# Patient Record
Sex: FEMALE | Race: WHITE | Employment: OTHER | URBAN - NONMETROPOLITAN AREA
[De-identification: names, ages, dates, MRNs, and addresses within clinical notes are randomized per-mention and may not be internally consistent; named-entity substitution may affect disease eponyms.]

---

## 2017-03-23 ENCOUNTER — COMMUNICATION - GICH (OUTPATIENT)
Dept: FAMILY MEDICINE | Facility: OTHER | Age: 82
End: 2017-03-23

## 2017-03-23 DIAGNOSIS — J30.89 OTHER ALLERGIC RHINITIS: ICD-10-CM

## 2017-03-27 ENCOUNTER — AMBULATORY - GICH (OUTPATIENT)
Dept: SCHEDULING | Facility: OTHER | Age: 82
End: 2017-03-27

## 2017-10-25 ENCOUNTER — AMBULATORY - GICH (OUTPATIENT)
Dept: FAMILY MEDICINE | Facility: OTHER | Age: 82
End: 2017-10-25

## 2017-10-25 DIAGNOSIS — Z23 ENCOUNTER FOR IMMUNIZATION: ICD-10-CM

## 2017-12-26 ENCOUNTER — COMMUNICATION - GICH (OUTPATIENT)
Dept: FAMILY MEDICINE | Facility: OTHER | Age: 82
End: 2017-12-26

## 2017-12-26 DIAGNOSIS — I25.10 ATHEROSCLEROTIC HEART DISEASE OF NATIVE CORONARY ARTERY WITHOUT ANGINA PECTORIS: ICD-10-CM

## 2017-12-26 DIAGNOSIS — J30.89 OTHER ALLERGIC RHINITIS: ICD-10-CM

## 2017-12-26 DIAGNOSIS — L90.0 LICHEN SCLEROSUS ET ATROPHICUS: ICD-10-CM

## 2018-01-01 DIAGNOSIS — L90.0 LICHEN SCLEROSUS: Primary | ICD-10-CM

## 2018-01-01 RX ORDER — CLOBETASOL PROPIONATE 0.5 MG/G
OINTMENT TOPICAL
Qty: 30 G | Refills: 1 | Status: ON HOLD | OUTPATIENT
Start: 2018-01-01 | End: 2019-01-01 | Stop reason: DRUGHIGH

## 2018-01-04 NOTE — TELEPHONE ENCOUNTER
Patient Information     Patient Name MRN Sex Karly Marie [id number] Female 1935      Telephone Encounter by Alana Willett RN at 3/23/2017 11:12 AM     Author:  Alana Willett RN Service:  (none) Author Type:  NURS- Registered Nurse     Filed:  3/23/2017 11:15 AM Encounter Date:  3/23/2017 Status:  Signed     :  Alana Willett RN (NURS- Registered Nurse)            Antihistamines:    Office visit in the past 12 months.    Last visit with GALDINO MONSIVAIS was on: 2016 in [facility]  Next visit with GALDINO MONSIVAIS is on: No future appointment listed with this provider  Next visit with Family Practice is on: No future appointment listed in this department    Max refills 12 months from last office visit.  Prescription refilled per RN Medication Refill Policy.................... Alana Willett RN ....................  3/23/2017   11:14 AM

## 2018-02-12 NOTE — TELEPHONE ENCOUNTER
Patient Information     Patient Name MRN Sex Karly Marie [id number] Female 1935      Telephone Encounter by Alana Goldberg at 2017  2:06 PM     Author:  Alana Goldberg Service:  (none) Author Type:  (none)     Filed:  2017  2:07 PM Encounter Date:  2017 Status:  Signed     :  Alana Goldberg            Patient notified prescriptions were filled and she should schedule establish care appointment.  Alana Goldberg LPN..................2017  2:07 PM

## 2018-02-12 NOTE — TELEPHONE ENCOUNTER
Patient Information     Patient Name MRN Sex Karly Marie [id number] Female 1935      Telephone Encounter by Alana Fraga RN at 2017 10:20 AM     Author:  Alana Fraga RN Service:  (none) Author Type:  NURS- Registered Nurse     Filed:  2017 11:18 AM Encounter Date:  2017 Status:  Signed     :  Alana Fraga RN (NURS- Registered Nurse)            PLEASE REVIEW, SIGN AND SEND AS APPROPRIATE: THANK YOU.    Patient will be due for annual visit on 2017. In the absence of Galdino Monsivais MD patient was called to recommend that they establish care with one of our other providers. Patient intends to establish care with Sydney Viera DO.    Patient is requesting a limited refill at this time. Please review and sign if appropriate.    This is a Refill request from: [facility]  Name of Medication: clobetasol 0.5% (TEMOVATE 0.005% OINTMENT)  Quantity requested: 15 g  Last fill date: 2016 (#15g, R-2)  Due for refill: Yes  Last visit with GALDINO MONSIVAIS was on: 2016 in [facility]  PCP:  No Pcp  Controlled Substance Agreement: None for this medication  Diagnosis r/t this medication request: Lichen sclerosus    This is a Refill request from: [facility]  Name of Medication: clopidogrel (PLAVIX)  Quantity requested: 90 tabs  Last fill date: 2016 (#90, R-3)  Due for refill: Yes  Last visit with GALDINO MONSIVAIS was on: 2016 in [facility]  PCP:  No Pcp  Controlled Substance Agreement:  None for this medication  Diagnosis r/t this medication request: ASHD    This is a Refill request from: [facility]  Name of Medication: famotidine (PEPCID)  Quantity requested: 180 tabs  Last fill date: 2016 (#180, R-3)  Due for refill: Yes  Last visit with GALDINO MONSIVAIS was on: 2016 in [facility]  PCP:  No Pcp  Controlled Substance Agreement: None for this  medication  Diagnosis r/t this medication request: ashd    This is a Refill request from: [facility]  Name of Medication: loratadine (CLARITIN)  Quantity requested: 90 tabs  Last fill date: 3/23/2017 (#90, R-2)  Due for refill: Yes  Last visit with GALDINO MONSIVAIS was on: 12/30/2016 in [facility]  PCP:  No Pcp  Controlled Substance Agreement:  None for this medication  Diagnosis r/t this medication request: Allergic rhinitis    This is a Refill request from: [facility]  Name of Medication: losartan (COZAAR)  Quantity requested: 90 tabs  Last fill date: 11/23/2016 (#90, R-3)  Due for refill: Yes  Last visit with GALDINO MONSIVAIS was on: 12/30/2016 in [facility]  PCP:  No Pcp  Controlled Substance Agreement: None for this medication  Diagnosis r/t this medication request: ASHD    This is a Refill request from: [facility]  Name of Medication: metoprolol tartrate (LOPRESSOR)  Quantity requested: 180 tabs  Last fill date: 11/23/2016 (#180, R-3)  Due for refill: Yes  Last visit with GALDINO MONSIVAIS was on: 12/30/2016 in [facility]  PCP:  No Pcp  Controlled Substance Agreement: None for this medication  Diagnosis r/t this medication request: ASHD    This is a Refill request from: [facility]  Name of Medication: simvastatin (ZOCOR)  Quantity requested: 90 tabs  Last fill date: 11/23/2016 (#90, R-3)  Due for refill: Yes  Last visit with GALDINO MONSIVAIS was on: 12/30/2016 in [facility]  PCP:  No Pcp  Controlled Substance Agreement: None for this medication  Diagnosis r/t this medication request: ASHD     Unable to complete prescription refill due to permanent absence of prescribing provider.................... Alana Fraga RN ....................  12/28/2017   10:20 AM

## 2018-02-12 NOTE — TELEPHONE ENCOUNTER
Patient Information     Patient Name MRN Sex Karly Marie [id number] Female 1935      Telephone Encounter by Sydney Viera DO at 2017 12:13 PM     Author:  Sydney Viera DO Service:  (none) Author Type:  PHYS- Osteopathic     Filed:  2017 12:13 PM Encounter Date:  2017 Status:  Signed     :  Sydney Viera DO (PHYS- Osteopathic)            Rx refilled.  Patient should set up appointment to est care with me in the next 1-2 months

## 2018-02-23 ENCOUNTER — DOCUMENTATION ONLY (OUTPATIENT)
Dept: FAMILY MEDICINE | Facility: OTHER | Age: 83
End: 2018-02-23

## 2018-02-23 PROBLEM — I78.1 NEVUS, NON-NEOPLASTIC: Status: ACTIVE | Noted: 2018-02-23

## 2018-02-23 PROBLEM — N31.8 HYPERTONICITY OF BLADDER: Status: ACTIVE | Noted: 2018-02-23

## 2018-02-23 PROBLEM — I83.10 VARICOSE VEINS OF LOWER EXTREMITIES WITH INFLAMMATION: Status: ACTIVE | Noted: 2018-02-23

## 2018-02-23 PROBLEM — M94.9 DISORDER OF BONE AND CARTILAGE: Status: ACTIVE | Noted: 2018-02-23

## 2018-02-23 PROBLEM — J30.9 ALLERGIC RHINITIS: Status: ACTIVE | Noted: 2018-02-23

## 2018-02-23 PROBLEM — E66.9 OBESITY: Status: ACTIVE | Noted: 2018-02-23

## 2018-02-23 PROBLEM — E78.5 HYPERLIPIDEMIA: Status: ACTIVE | Noted: 2018-02-23

## 2018-02-23 PROBLEM — L20.9 DERMATITIS, ATOPIC: Status: ACTIVE | Noted: 2018-02-23

## 2018-02-23 PROBLEM — M89.9 DISORDER OF BONE AND CARTILAGE: Status: ACTIVE | Noted: 2018-02-23

## 2018-02-23 PROBLEM — M19.90 OSTEOARTHROSIS: Status: ACTIVE | Noted: 2018-02-23

## 2018-02-23 PROBLEM — I10 ESSENTIAL HYPERTENSION: Status: ACTIVE | Noted: 2018-02-23

## 2018-02-23 RX ORDER — SIMVASTATIN 20 MG
20 TABLET ORAL AT BEDTIME
COMMUNITY
Start: 2017-12-28 | End: 2018-03-30

## 2018-02-23 RX ORDER — LATANOPROST 50 UG/ML
1 SOLUTION/ DROPS OPHTHALMIC EVERY EVENING
COMMUNITY
Start: 2015-11-11

## 2018-02-23 RX ORDER — LOSARTAN POTASSIUM 50 MG/1
1 TABLET ORAL DAILY
COMMUNITY
Start: 2017-12-28 | End: 2018-03-30

## 2018-02-23 RX ORDER — DOCUSATE SODIUM 100 MG/1
100 CAPSULE, LIQUID FILLED ORAL DAILY PRN
COMMUNITY
Start: 2016-12-30

## 2018-02-23 RX ORDER — ACETAMINOPHEN 500 MG
2 TABLET ORAL DAILY PRN
Status: ON HOLD | COMMUNITY
End: 2019-01-01

## 2018-02-23 RX ORDER — LORATADINE 10 MG/1
10 TABLET ORAL DAILY
COMMUNITY
Start: 2017-12-28 | End: 2018-03-30

## 2018-02-23 RX ORDER — METOPROLOL TARTRATE 25 MG/1
25 TABLET, FILM COATED ORAL 2 TIMES DAILY
COMMUNITY
Start: 2017-12-28 | End: 2018-03-30

## 2018-02-23 RX ORDER — CLOBETASOL PROPIONATE 0.5 MG/G
OINTMENT TOPICAL
COMMUNITY
Start: 2017-12-28 | End: 2018-01-01

## 2018-02-23 RX ORDER — DORZOLAMIDE HYDROCHLORIDE AND TIMOLOL MALEATE 20; 5 MG/ML; MG/ML
1 SOLUTION/ DROPS OPHTHALMIC 2 TIMES DAILY
COMMUNITY
Start: 2015-11-11

## 2018-02-23 RX ORDER — FAMOTIDINE 20 MG/1
20 TABLET, FILM COATED ORAL 2 TIMES DAILY
COMMUNITY
Start: 2017-12-28 | End: 2018-03-30

## 2018-02-23 RX ORDER — NITROGLYCERIN 0.4 MG/1
0.4 TABLET SUBLINGUAL EVERY 5 MIN PRN
COMMUNITY
Start: 2016-11-23 | End: 2018-05-10

## 2018-02-23 RX ORDER — CLOPIDOGREL BISULFATE 75 MG/1
75 TABLET ORAL EVERY MORNING
COMMUNITY
Start: 2017-12-28 | End: 2018-03-30

## 2018-02-23 RX ORDER — ASPIRIN 81 MG/1
81 TABLET ORAL
Status: ON HOLD | COMMUNITY
Start: 2013-10-30 | End: 2019-01-01

## 2018-03-30 DIAGNOSIS — J31.0 CHRONIC RHINITIS, UNSPECIFIED TYPE: ICD-10-CM

## 2018-03-30 DIAGNOSIS — I25.10 ASHD (ARTERIOSCLEROTIC HEART DISEASE): ICD-10-CM

## 2018-03-30 DIAGNOSIS — I10 ESSENTIAL HYPERTENSION: Primary | ICD-10-CM

## 2018-03-30 DIAGNOSIS — E78.5 HYPERLIPIDEMIA, UNSPECIFIED HYPERLIPIDEMIA TYPE: ICD-10-CM

## 2018-03-30 NOTE — LETTER
April 4, 2018      Karly ROSA Seema  [address] [facility] [address]        Dear Ms. Stephenson,    Your pharmacy has requested a refill of some medications which Dr. Sydney Viera has refilled with a limited supply sent into [facility].      According to our records, you are overdue for annual medication management and labs. Your health is very important to us. Please contact our scheduling line at [phone] to set up an appointment to establish care with Dr. Sydney Viera as Dr. Maldonado is no longer available in the clinic. This will need to be done prior to any additional refills being given.       Thank you for choosing [facility] and [facility]\"" for your health care needs.     Sincerely,        The Refill Nurses  [facility]

## 2018-04-04 RX ORDER — CLOPIDOGREL BISULFATE 75 MG/1
TABLET ORAL
Qty: 90 TABLET | Refills: 0 | Status: SHIPPED | OUTPATIENT
Start: 2018-04-04 | End: 2018-05-10

## 2018-04-04 RX ORDER — FAMOTIDINE 20 MG/1
TABLET, FILM COATED ORAL
Qty: 180 TABLET | Refills: 0 | Status: SHIPPED | OUTPATIENT
Start: 2018-04-04 | End: 2018-05-10

## 2018-04-04 RX ORDER — METOPROLOL TARTRATE 25 MG/1
TABLET, FILM COATED ORAL
Qty: 180 TABLET | Refills: 0 | Status: SHIPPED | OUTPATIENT
Start: 2018-04-04 | End: 2018-05-10

## 2018-04-04 RX ORDER — LOSARTAN POTASSIUM 50 MG/1
TABLET ORAL
Qty: 90 TABLET | Refills: 0 | Status: SHIPPED | OUTPATIENT
Start: 2018-04-04 | End: 2018-05-10

## 2018-04-04 RX ORDER — SIMVASTATIN 20 MG
TABLET ORAL
Qty: 90 TABLET | Refills: 0 | Status: SHIPPED | OUTPATIENT
Start: 2018-04-04 | End: 2018-05-10

## 2018-04-04 RX ORDER — LORATADINE 10 MG/1
TABLET ORAL
Qty: 90 TABLET | Refills: 0 | Status: SHIPPED | OUTPATIENT
Start: 2018-04-04 | End: 2018-05-10

## 2018-04-04 NOTE — TELEPHONE ENCOUNTER
Letter sent to patient informing her that she must establish care prior to additional refills being needed. Alana Retana RN on 4/4/2018 at 9:39 AM

## 2018-04-04 NOTE — TELEPHONE ENCOUNTER
Medications renewed for 90 days.  I would recommend that she set up an establish care visit with me or another provider prior to July

## 2018-04-04 NOTE — TELEPHONE ENCOUNTER
Pharmacy is requesting refill of multiple medications. Patient has not established care with Dr. Viera as she stated she would with limited refills given in December, 2017. (former Dr. Maldonado patient).  Alana Retana RN on 4/4/2018 at 8:48 AM

## 2018-05-10 ENCOUNTER — OFFICE VISIT (OUTPATIENT)
Dept: INTERNAL MEDICINE | Facility: OTHER | Age: 83
End: 2018-05-10
Attending: INTERNAL MEDICINE
Payer: COMMERCIAL

## 2018-05-10 VITALS
TEMPERATURE: 97.4 F | HEIGHT: 63 IN | BODY MASS INDEX: 39.11 KG/M2 | WEIGHT: 220.7 LBS | HEART RATE: 64 BPM | DIASTOLIC BLOOD PRESSURE: 88 MMHG | SYSTOLIC BLOOD PRESSURE: 150 MMHG

## 2018-05-10 DIAGNOSIS — E78.5 HYPERLIPIDEMIA, UNSPECIFIED HYPERLIPIDEMIA TYPE: ICD-10-CM

## 2018-05-10 DIAGNOSIS — J31.0 CHRONIC RHINITIS, UNSPECIFIED TYPE: ICD-10-CM

## 2018-05-10 DIAGNOSIS — E53.8 VITAMIN B12 DEFICIENCY (NON ANEMIC): ICD-10-CM

## 2018-05-10 DIAGNOSIS — R71.8 ELEVATED MCV: ICD-10-CM

## 2018-05-10 DIAGNOSIS — Z79.899 HIGH RISK MEDICATION USE: Primary | ICD-10-CM

## 2018-05-10 DIAGNOSIS — I25.10 ASCVD (ARTERIOSCLEROTIC CARDIOVASCULAR DISEASE): ICD-10-CM

## 2018-05-10 DIAGNOSIS — I10 ESSENTIAL HYPERTENSION: ICD-10-CM

## 2018-05-10 PROBLEM — I78.1 NEVUS, NON-NEOPLASTIC: Status: RESOLVED | Noted: 2018-02-23 | Resolved: 2018-05-10

## 2018-05-10 PROBLEM — I83.10 VARICOSE VEINS OF LOWER EXTREMITIES WITH INFLAMMATION: Status: RESOLVED | Noted: 2018-02-23 | Resolved: 2018-05-10

## 2018-05-10 PROBLEM — L20.9 DERMATITIS, ATOPIC: Status: RESOLVED | Noted: 2018-02-23 | Resolved: 2018-05-10

## 2018-05-10 PROBLEM — J30.9 ALLERGIC RHINITIS: Status: RESOLVED | Noted: 2018-02-23 | Resolved: 2018-05-10

## 2018-05-10 PROBLEM — M89.9 DISORDER OF BONE AND CARTILAGE: Status: RESOLVED | Noted: 2018-02-23 | Resolved: 2018-05-10

## 2018-05-10 PROBLEM — M94.9 DISORDER OF BONE AND CARTILAGE: Status: RESOLVED | Noted: 2018-02-23 | Resolved: 2018-05-10

## 2018-05-10 PROBLEM — M19.90 OSTEOARTHROSIS: Status: RESOLVED | Noted: 2018-02-23 | Resolved: 2018-05-10

## 2018-05-10 LAB
ALBUMIN SERPL-MCNC: 4.1 G/DL (ref 3.5–5.7)
ALP SERPL-CCNC: 77 U/L (ref 34–104)
ALT SERPL W P-5'-P-CCNC: 14 U/L (ref 7–52)
ANION GAP SERPL CALCULATED.3IONS-SCNC: 7 MMOL/L (ref 3–14)
AST SERPL W P-5'-P-CCNC: 19 U/L (ref 13–39)
BASOPHILS # BLD AUTO: 0 10E9/L (ref 0–0.2)
BASOPHILS NFR BLD AUTO: 0.7 %
BILIRUB SERPL-MCNC: 0.7 MG/DL (ref 0.3–1)
BUN SERPL-MCNC: 21 MG/DL (ref 7–25)
CALCIUM SERPL-MCNC: 10.3 MG/DL (ref 8.6–10.3)
CHLORIDE SERPL-SCNC: 102 MMOL/L (ref 98–107)
CO2 SERPL-SCNC: 29 MMOL/L (ref 21–31)
CREAT SERPL-MCNC: 1.04 MG/DL (ref 0.6–1.2)
DIFFERENTIAL METHOD BLD: ABNORMAL
EOSINOPHIL # BLD AUTO: 0.2 10E9/L (ref 0–0.7)
EOSINOPHIL NFR BLD AUTO: 3.3 %
ERYTHROCYTE [DISTWIDTH] IN BLOOD BY AUTOMATED COUNT: 13.3 % (ref 10–15)
GFR SERPL CREATININE-BSD FRML MDRD: 51 ML/MIN/1.7M2
GLUCOSE SERPL-MCNC: 102 MG/DL (ref 70–105)
HCT VFR BLD AUTO: 38.9 % (ref 35–47)
HGB BLD-MCNC: 12.4 G/DL (ref 11.7–15.7)
IMM GRANULOCYTES # BLD: 0 10E9/L (ref 0–0.4)
IMM GRANULOCYTES NFR BLD: 0.2 %
LYMPHOCYTES # BLD AUTO: 2.2 10E9/L (ref 0.8–5.3)
LYMPHOCYTES NFR BLD AUTO: 37.7 %
MCH RBC QN AUTO: 32.4 PG (ref 26.5–33)
MCHC RBC AUTO-ENTMCNC: 31.9 G/DL (ref 31.5–36.5)
MCV RBC AUTO: 102 FL (ref 78–100)
MONOCYTES # BLD AUTO: 0.5 10E9/L (ref 0–1.3)
MONOCYTES NFR BLD AUTO: 9.4 %
NEUTROPHILS # BLD AUTO: 2.8 10E9/L (ref 1.6–8.3)
NEUTROPHILS NFR BLD AUTO: 48.7 %
PLATELET # BLD AUTO: 141 10E9/L (ref 150–450)
POTASSIUM SERPL-SCNC: 4.4 MMOL/L (ref 3.5–5.1)
PROT SERPL-MCNC: 7.1 G/DL (ref 6.4–8.9)
RBC # BLD AUTO: 3.83 10E12/L (ref 3.8–5.2)
SODIUM SERPL-SCNC: 138 MMOL/L (ref 134–144)
VIT B12 SERPL-MCNC: >1500 PG/ML (ref 180–914)
WBC # BLD AUTO: 5.8 10E9/L (ref 4–11)

## 2018-05-10 PROCEDURE — 80053 COMPREHEN METABOLIC PANEL: CPT | Performed by: INTERNAL MEDICINE

## 2018-05-10 PROCEDURE — 36415 COLL VENOUS BLD VENIPUNCTURE: CPT | Performed by: INTERNAL MEDICINE

## 2018-05-10 PROCEDURE — 82607 VITAMIN B-12: CPT | Performed by: INTERNAL MEDICINE

## 2018-05-10 PROCEDURE — G0463 HOSPITAL OUTPT CLINIC VISIT: HCPCS

## 2018-05-10 PROCEDURE — 85025 COMPLETE CBC W/AUTO DIFF WBC: CPT | Performed by: INTERNAL MEDICINE

## 2018-05-10 PROCEDURE — 99215 OFFICE O/P EST HI 40 MIN: CPT | Performed by: INTERNAL MEDICINE

## 2018-05-10 RX ORDER — FAMOTIDINE 20 MG/1
20 TABLET, FILM COATED ORAL 2 TIMES DAILY
Qty: 180 TABLET | Refills: 4 | Status: SHIPPED | OUTPATIENT
Start: 2018-05-10

## 2018-05-10 RX ORDER — METOPROLOL TARTRATE 25 MG/1
25 TABLET, FILM COATED ORAL 2 TIMES DAILY
Qty: 180 TABLET | Refills: 4 | Status: SHIPPED | OUTPATIENT
Start: 2018-05-10

## 2018-05-10 RX ORDER — NITROGLYCERIN 0.4 MG/1
0.4 TABLET SUBLINGUAL EVERY 5 MIN PRN
Qty: 25 TABLET | Refills: 0 | Status: SHIPPED | OUTPATIENT
Start: 2018-05-10

## 2018-05-10 RX ORDER — SIMVASTATIN 20 MG
20 TABLET ORAL AT BEDTIME
Qty: 90 TABLET | Refills: 4 | Status: SHIPPED | OUTPATIENT
Start: 2018-05-10

## 2018-05-10 RX ORDER — CLOPIDOGREL BISULFATE 75 MG/1
75 TABLET ORAL DAILY
Qty: 90 TABLET | Refills: 4 | Status: ON HOLD | OUTPATIENT
Start: 2018-05-10 | End: 2019-01-01

## 2018-05-10 RX ORDER — LOSARTAN POTASSIUM 50 MG/1
50 TABLET ORAL DAILY
Qty: 90 TABLET | Refills: 4 | Status: ON HOLD | OUTPATIENT
Start: 2018-05-10 | End: 2019-01-01

## 2018-05-10 RX ORDER — LORATADINE 10 MG/1
10 TABLET ORAL DAILY
Qty: 90 TABLET | Refills: 4 | Status: SHIPPED | OUTPATIENT
Start: 2018-05-10

## 2018-05-10 ASSESSMENT — PAIN SCALES - GENERAL: PAINLEVEL: NO PAIN (0)

## 2018-05-10 NOTE — NURSING NOTE
Patient is here today for her yearly physical and med management.Tegan Rendon LPN......................5/10/2018 2:49 PM

## 2018-05-10 NOTE — PROGRESS NOTES
Chief Complaint   Patient presents with     Recheck Medication     med management          HPI: Ms. Stephenson is a 83 year old female who presents today for annual review of her medications.  She overall is feeling good.      She has a history of hypertension and is on losartan and metoprolol for this.  Her blood pressure at home has been 120/60.  It is slightly elevated here today though she states it is always elevated in the clinic.  She also has a history of cardiovascular disease.  She is no longer following with cardiology.  In 2010 she did undergo stenting.  She has been on Plavix and aspirin since then.  She has not had any issues with the medication.  She does believe she was instructed to be on this lifelong.  She does take simvastatin for her hyperlipidemia.      She does have a chronic rhinitis and does take Claritin for this.  She denies any side effects from the medication.  She has been taking vitamin B12 limitation.  She did previously see oncology and was on injections at one-point however did switch to oral supplementation over a year ago.  She has not had her B12 rechecked.  In reviewing her last CBC she did have an elevation of her MCV.    She is post menopausal.  She is up-to-date on her vaccines at this time.  She is no longer interested in cancer screening.    History is discussed and updated on 5/10/2018 with patient.  It is current to the best of my knowledge as below.    Past Medical History:   Diagnosis Date     Allergic rhinitis      ASCVD (arteriosclerotic cardiovascular disease) 05/05/2010    STEMI  received TNK, Cor angio with Markedly ectatic proximal to mid dominant LCX with distal likely embolic OM3 culprit     Carcinoma in situ of vulva 2000     Initial CIS, subsequent ALEXANDER III, local excisions     Colon polyp 10/11/2005     Dermatitis, atopic 2/23/2018     Essential hypertension 2/23/2018     Glaucoma 2005     Hyperlipidemia      Hypertonic bladder      Low back pain      Obesity       Osteoarthritis      Phlebitis and thrombophlebitis     2007, 2012,legs     Thrombocytopenia (H)      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     Vitamin B 12 deficiency 3/5/2014        Past Surgical History:   Procedure Laterality Date     ANGIOGRAM  05/2010    coronary angio at [facility]     ARTHROPLASTY HIP  1999    done after fracture     ARTHROPLASTY KNEE Bilateral 2000     ARTHROSCOPY SHOULDER ROTATOR CUFF REPAIR Left 2002     COLONOSCOPY  10/2005    tubular adenoma     EYE SURGERY  04/2008    laser surgery glaucoma; again in 2012     TONSILLECTOMY, ADENOIDECTOMY, COMBINED  1999     VULVECTOMY SIMPLE  2000    CIS     VULVECTOMY SIMPLE  06/2005    ALEXANDER III         Current Outpatient Prescriptions   Medication Sig Dispense Refill     acetaminophen (TYLENOL) 500 MG tablet Take 2 tablets by mouth daily . Max acetaminophen dose: 4,000 mg per 24 hours.       aspirin EC 81 MG EC tablet Take 81 mg by mouth daily with food       calcium citrate-vitamin D (CALCIUM CITRATE + D) 315-250 MG-UNIT TABS per tablet Take 2 tablets by mouth 2 times daily (with meals)       Cholecalciferol (VITAMIN D3) 1000 UNITS CAPS Take 1,000 Units by mouth daily       clobetasol (TEMOVATE) 0.05 % ointment        clopidogrel (PLAVIX) 75 MG tablet Take 1 tablet (75 mg) by mouth daily 90 tablet 4     cyanocobalamin (RA VITAMIN B-12 TR) 1000 MCG TBCR Take 1,000 mcg by mouth daily       docusate sodium (COLACE) 100 MG capsule Take 100 mg by mouth 2 times daily as needed for constipation       dorzolamide-timolol (COSOPT) 2-0.5 % ophthalmic solution Place 1 drop into both eyes 2 times daily       famotidine (PEPCID) 20 MG tablet Take 1 tablet (20 mg) by mouth 2 times daily 180 tablet 4     latanoprost (XALATAN) 0.005 % ophthalmic solution Place 1 drop into both eyes every evening       loratadine (CLARITIN) 10 MG tablet Take 1 tablet (10 mg) by mouth daily 90 tablet 4     losartan (COZAAR) 50 MG tablet Take 1  tablet (50 mg) by mouth daily 90 tablet 4     metoprolol tartrate (LOPRESSOR) 25 MG tablet Take 1 tablet (25 mg) by mouth 2 times daily 180 tablet 4     nitroGLYcerin (NITROSTAT) 0.4 MG sublingual tablet Place 1 tablet (0.4 mg) under the tongue every 5 minutes as needed for chest pain 25 tablet 0     simvastatin (ZOCOR) 20 MG tablet Take 1 tablet (20 mg) by mouth At Bedtime 90 tablet 4       Allergies   Allergen Reactions     Codeine Nausea     Doxycycline Other (See Comments)     jittery        Family History   Problem Relation Age of Onset     Hypertension Father      Hypertension,h/o CVA.  in his 70s     CANCER Mother      Cancer, age 92, lung cancer     Breast Cancer Sister      Ulcerative Colitis Sister      DIABETES Sister      No Known Problems Brother        Social History     Social History     Marital status:      Spouse name: David     Number of children: N/A     Years of education: N/A     Social History Main Topics     Smoking status: Former Smoker     Packs/day: 0.50     Years: 30.00     Types: Cigarettes     Quit date: 1982     Smokeless tobacco: Never Used     Alcohol use No     Drug use: No      Comment: Drug use: No     Sexual activity: Not Currently     Other Topics Concern     None     Social History Narrative    Patient is , three adopted children.   with early dementia.              ROS  GEN: -fevers/-chills/-night sweats/+wt change -6 pound weight gain in the last year and a half overall stable from past  NEURO: -headaches/-vision changes  EARS: -hearing changes/-tinnitus  NOSE: -drainage/-congestion  MOUTH/THROAT: - sore throat/-dysphagia/-sores  LUNGS: -sob/-cough  CARDIOVASCULAR: -cp/-palpitations  GI: -pain/-diarrhea/-constipation/-bloody stools  :-dysuria/-hematuria/-sores/-vaginal discharge or bleeding  ENDOCRINE: -skin or hair changes  HEMATOLOGIC/LYMPHATIC: -swollen nodes  SKIN: -rashes/-lesions/-breast or nipple changes  MSK/RHEUM: +joint  "pain/-swelling  NEURO: -weakness/-parasthesias  PSYCH: -anhedonia/-depression/-anxiety  SLEEP: -daytime somnolence         EXAM:   /88 (BP Location: Right arm, Patient Position: Sitting, Cuff Size: Adult Large)  Pulse 64  Temp 97.4  F (36.3  C) (Tympanic)  Ht 5' 2.5\" (1.588 m)  Wt 220 lb 11.2 oz (100.1 kg)  Breastfeeding? No  BMI 39.72 kg/m2  Estimated body mass index is 39.72 kg/(m^2) as calculated from the following:    Height as of this encounter: 5' 2.5\" (1.588 m).    Weight as of this encounter: 220 lb 11.2 oz (100.1 kg).      GEN: Vitals reviewed. Healthy appearing. Patient is in no acute distress. Cooperative with exam.  HEENT: Normocephalic atraumatic.  Normal external eye, conjunctiva, lids, cornea with no scleral icterus or conjunctival erythema. Pupils equally round. Oropharynx with no erythema or exudates. Dentition adequate.    NECK: Supple; no thyromegaly or masses noted.  No cervical or supraclavicular lymphadenopathy.  CV: Heart regular in rate and rhythm with no murmur.    LUNGS: Lungs clear to auscultation bilaterally.  Chest rise equal bilaterally.  No accessory muscle use.  ABD:  Obese, Soft, nontender, and nondistended.  No rebound. Bowel sounds positive.  SKIN: Warm and dry to touch.  No rash on face, arms and legs.  EXT: No clubbing or cyanosis.   1+ bilateral lower extremity peripheral edema.  NEURO: Alert and oriented to person, place, and time.  Cranial nerves II-XII grossly intact with no focal or lateralizing deficits.  Muscle tone normal.  Gait normal. No tremor. Sensation intact to light touch.  MSK: ROM of upper and lower ext symmetric and full.  PSYCH:Affect appropriate. Speech fluent. Answers questions appropriately and thought process normal.    LABS: 5/10/2018 - Personally ordered/reviewed  Results for orders placed or performed in visit on 05/10/18   Comprehensive metabolic panel   Result Value Ref Range    Sodium 138 134 - 144 mmol/L    Potassium 4.4 3.5 - 5.1 mmol/L "    Chloride 102 98 - 107 mmol/L    Carbon Dioxide 29 21 - 31 mmol/L    Anion Gap 7 3 - 14 mmol/L    Glucose 102 70 - 105 mg/dL    Urea Nitrogen 21 7 - 25 mg/dL    Creatinine 1.04 0.60 - 1.20 mg/dL    GFR Estimate 51 (L) >60 mL/min/1.7m2    GFR Estimate If Black 61 >60 mL/min/1.7m2    Calcium 10.3 8.6 - 10.3 mg/dL    Bilirubin Total 0.7 0.3 - 1.0 mg/dL    Albumin 4.1 3.5 - 5.7 g/dL    Protein Total 7.1 6.4 - 8.9 g/dL    Alkaline Phosphatase 77 34 - 104 U/L    ALT 14 7 - 52 U/L    AST 19 13 - 39 U/L   CBC and Differential   Result Value Ref Range    WBC 5.8 4.0 - 11.0 10e9/L    RBC Count 3.83 3.8 - 5.2 10e12/L    Hemoglobin 12.4 11.7 - 15.7 g/dL    Hematocrit 38.9 35.0 - 47.0 %     (H) 78 - 100 fl    MCH 32.4 26.5 - 33.0 pg    MCHC 31.9 31.5 - 36.5 g/dL    RDW 13.3 10.0 - 15.0 %    Platelet Count 141 (L) 150 - 450 10e9/L    Diff Method Automated Method     % Neutrophils 48.7 %    % Lymphocytes 37.7 %    % Monocytes 9.4 %    % Eosinophils 3.3 %    % Basophils 0.7 %    % Immature Granulocytes 0.2 %    Absolute Neutrophil 2.8 1.6 - 8.3 10e9/L    Absolute Lymphocytes 2.2 0.8 - 5.3 10e9/L    Absolute Monocytes 0.5 0.0 - 1.3 10e9/L    Absolute Eosinophils 0.2 0.0 - 0.7 10e9/L    Absolute Basophils 0.0 0.0 - 0.2 10e9/L    Abs Immature Granulocytes 0.0 0 - 0.4 10e9/L   Vitamin B12   Result Value Ref Range    Vitamin B12 >1500 (H) 180 - 914 pg/mL             ASSESSMENT AND PLAN:    1. Hyperlipidemia, unspecified hyperlipidemia type  -Stable at this time.    - On statin with no clinical evidence or complaint of myalgias or other ADRs  - Discussed importance of exercise and diet.  Weight management plan: Discussed healthy diet and exercise guidelines and patient will follow up in 12 months in clinic to re-evaluate.  - simvastatin (ZOCOR) 20 MG tablet; Take 1 tablet (20 mg) by mouth At Bedtime  Dispense: 90 tablet; Refill: 4    2. Essential hypertension  - Blood pressure today of150/88  Is close to the goal of <150/90.  -  Continue current regimen currently.  Instructed to check BP at home.  - Cautioned patient to monitor with antibiotics, herbals and any OTC medications  - electrolytes and renal function done and stable  - metoprolol tartrate (LOPRESSOR) 25 MG tablet; Take 1 tablet (25 mg) by mouth 2 times daily  Dispense: 180 tablet; Refill: 4  - losartan (COZAAR) 50 MG tablet; Take 1 tablet (50 mg) by mouth daily  Dispense: 90 tablet; Refill: 4  - Comprehensive metabolic panel    3. ASCVD (arteriosclerotic cardiovascular disease)  - on asa,statin, ARB, beta blocker, SLNTG  - no symptoms at this time; continue meds as prescribed although I will obtain outside records to review regarding her episode in 2010 with her STEMI and whether there is a reason to continue on the Plavix plus aspirin.  - metoprolol tartrate (LOPRESSOR) 25 MG tablet; Take 1 tablet (25 mg) by mouth 2 times daily  Dispense: 180 tablet; Refill: 4  - losartan (COZAAR) 50 MG tablet; Take 1 tablet (50 mg) by mouth daily  Dispense: 90 tablet; Refill: 4  - famotidine (PEPCID) 20 MG tablet; Take 1 tablet (20 mg) by mouth 2 times daily  Dispense: 180 tablet; Refill: 4  - clopidogrel (PLAVIX) 75 MG tablet; Take 1 tablet (75 mg) by mouth daily  Dispense: 90 tablet; Refill: 4  - nitroGLYcerin (NITROSTAT) 0.4 MG sublingual tablet; Place 1 tablet (0.4 mg) under the tongue every 5 minutes as needed for chest pain  Dispense: 25 tablet; Refill: 0    4. Chronic rhinitis, unspecified type  -Medication renewed.  - loratadine (CLARITIN) 10 MG tablet; Take 1 tablet (10 mg) by mouth daily  Dispense: 90 tablet; Refill: 4    5. High risk medication use  - Comprehensive metabolic panel  - CBC and Differential    6. Vitamin B12 deficiency (non anemic)  -B12 level is slightly high at this time.  She can decrease her supplement every other day or half tablet daily.  Plan to recheck in 1 year.  - Vitamin B12    7. Elevated MCV  -Rechecked and overall is stable.  We will continue to monitor  yearly.  - CBC and Differential    Follow-up in 1 year for annual review or as needed sooner.        WIL CAMPBELL DO   5/10/2018 4:53 PM    This document was prepared using voice generated softwear. While every attempt was made for accuracy, spelling and grammatical errors may exist.

## 2018-05-10 NOTE — MR AVS SNAPSHOT
"              After Visit Summary   5/10/2018    Karly Stephenson    MRN: [id number]           Patient Information     Date Of Birth          1935        Visit Information        Provider Department      5/10/2018 2:40 PM Sydney Viera DO [facility]        Today's Diagnoses     High risk medication use    -  1    Hyperlipidemia, unspecified hyperlipidemia type        Essential hypertension        ASCVD (arteriosclerotic cardiovascular disease)        Chronic rhinitis, unspecified type        Vitamin B12 deficiency (non anemic)        Elevated MCV           Follow-ups after your visit        Follow-up notes from your care team     Return in about 1 year (around 5/10/2019) for Annual Review.      Who to contact     If you have questions or need follow up information about today's clinic visit or your schedule please contact [facility] directly at [phone].  Normal or non-critical lab and imaging results will be communicated to you by Nukonahart, letter or phone within 4 business days after the clinic has received the results. If you do not hear from us within 7 days, please contact the clinic through Nukonahart or phone. If you have a critical or abnormal lab result, we will notify you by phone as soon as possible.  Submit refill requests through beRecruited or call your pharmacy and they will forward the refill request to us. Please allow 3 business days for your refill to be completed.          Additional Information About Your Visit        Nukonahart Information     beRecruited lets you send messages to your doctor, view your test results, renew your prescriptions, schedule appointments and more. To sign up, go to [email or web address] . Click on \"Log in\" on the left side of the screen, which will take you to the Welcome page. Then click on \"Sign up Now\" on the right side of the page.     You will be asked to enter the access code listed below, as well as some personal " "information. Please follow the directions to create your username and password.     Your access code is: [email or web address]  Expires: 2018  3:21 PM     Your access code will  in 90 days. If you need help or a new code, please call your [place] clinic or [phone].        Care EveryWhere ID     This is your Care EveryWhere ID. This could be used by other organizations to access your [place] medical records  [id number]        Your Vitals Were     Pulse Temperature Height Breastfeeding? BMI (Body Mass Index)       64 97.4  F (36.3  C) (Tympanic) 5' 2.5\" (1.588 m) No 39.72 kg/m2        Blood Pressure from Last 3 Encounters:   05/10/18 150/88   16 110/78   16 148/80    Weight from Last 3 Encounters:   05/10/18 220 lb 11.2 oz (100.1 kg)   16 214 lb (97.1 kg)   16 205 lb (93 kg)              We Performed the Following     CBC and Differential     Comprehensive metabolic panel     Vitamin B12          Today's Medication Changes          These changes are accurate as of 5/10/18  3:21 PM.  If you have any questions, ask your nurse or doctor.               These medicines have changed or have updated prescriptions.        Dose/Directions    clopidogrel 75 MG tablet   Commonly known as:  PLAVIX   This may have changed:  See the new instructions.   Used for:  ASCVD (arteriosclerotic cardiovascular disease)   Changed by:  Sydney Viera DO        Dose:  75 mg   Take 1 tablet (75 mg) by mouth daily   Quantity:  90 tablet   Refills:  4       famotidine 20 MG tablet   Commonly known as:  PEPCID   This may have changed:  See the new instructions.   Used for:  ASCVD (arteriosclerotic cardiovascular disease)   Changed by:  Sydney Viera DO        Dose:  20 mg   Take 1 tablet (20 mg) by mouth 2 times daily   Quantity:  180 tablet   Refills:  4       loratadine 10 MG tablet   Commonly known as:  CLARITIN   This may have changed:  See the new instructions.   Used for:  Chronic rhinitis, unspecified type "   Changed by:  Sydney Viera DO        Dose:  10 mg   Take 1 tablet (10 mg) by mouth daily   Quantity:  90 tablet   Refills:  4       losartan 50 MG tablet   Commonly known as:  COZAAR   This may have changed:  See the new instructions.   Used for:  Essential hypertension, ASCVD (arteriosclerotic cardiovascular disease)   Changed by:  Sydney Viera DO        Dose:  50 mg   Take 1 tablet (50 mg) by mouth daily   Quantity:  90 tablet   Refills:  4       metoprolol tartrate 25 MG tablet   Commonly known as:  LOPRESSOR   This may have changed:  See the new instructions.   Used for:  Essential hypertension, ASCVD (arteriosclerotic cardiovascular disease)   Changed by:  Sydney Viera DO        Dose:  25 mg   Take 1 tablet (25 mg) by mouth 2 times daily   Quantity:  180 tablet   Refills:  4       simvastatin 20 MG tablet   Commonly known as:  ZOCOR   This may have changed:  See the new instructions.   Used for:  Hyperlipidemia, unspecified hyperlipidemia type   Changed by:  Sydney Viera DO        Dose:  20 mg   Take 1 tablet (20 mg) by mouth At Bedtime   Quantity:  90 tablet   Refills:  4            Where to get your medicines      These medications were sent to [facility] - [facility] [address]  [address], [facility] [address]     Phone:  [phone]     clopidogrel 75 MG tablet    famotidine 20 MG tablet    loratadine 10 MG tablet    losartan 50 MG tablet    metoprolol tartrate 25 MG tablet    nitroGLYcerin 0.4 MG sublingual tablet    simvastatin 20 MG tablet                Primary Care Provider Fax #    Physician No Ref-Primary [phone]       No address on file        Equal Access to Services     ANDREW [facility] HospitalJOSE ANGEL : Hadii anneliese campoo Soeliseo, waaxda luqadaha, qaybta kaalmada adeegyada, annie gorman . So [facility] [phone].    ATENCIÓN: Si habla español, tiene a mas disposición servicios gratuitos de asistencia lingüística. Llame al  [phone].    We comply with applicable federal civil rights laws and [place] laws. We do not discriminate on the basis of race, color, national origin, age, disability, sex, sexual orientation, or gender identity.            Thank you!     Thank you for choosing [facility] [place] [facility]  for your care. Our goal is always to provide you with excellent care. Hearing back from our patients is one way we can continue to improve our services. Please take a few minutes to complete the written survey that you may receive in the mail after your visit with us. Thank you!             Your Updated Medication List - Protect others around you: Learn how to safely use, store and throw away your medicines at www.disposemymeds.org.          This list is accurate as of 5/10/18  3:21 PM.  Always use your most recent med list.                   Brand Name Dispense Instructions for use Diagnosis    acetaminophen 500 MG tablet    TYLENOL     Take 2 tablets by mouth daily . Max acetaminophen dose: 4,000 mg per 24 hours.        aspirin 81 MG EC tablet      Take 81 mg by mouth daily with food        CALCIUM CITRATE + D 315-250 MG-UNIT Tabs per tablet   Generic drug:  calcium citrate-vitamin D      Take 2 tablets by mouth 2 times daily (with meals)        clobetasol 0.05 % ointment    TEMOVATE          clopidogrel 75 MG tablet    PLAVIX    90 tablet    Take 1 tablet (75 mg) by mouth daily    ASCVD (arteriosclerotic cardiovascular disease)       docusate sodium 100 MG capsule    COLACE     Take 100 mg by mouth 2 times daily as needed for constipation        dorzolamide-timolol 2-0.5 % ophthalmic solution    COSOPT     Place 1 drop into both eyes 2 times daily        famotidine 20 MG tablet    PEPCID    180 tablet    Take 1 tablet (20 mg) by mouth 2 times daily    ASCVD (arteriosclerotic cardiovascular disease)       latanoprost 0.005 % ophthalmic solution    XALATAN     Place 1 drop into both eyes every evening         loratadine 10 MG tablet    CLARITIN    90 tablet    Take 1 tablet (10 mg) by mouth daily    Chronic rhinitis, unspecified type       losartan 50 MG tablet    COZAAR    90 tablet    Take 1 tablet (50 mg) by mouth daily    Essential hypertension, ASCVD (arteriosclerotic cardiovascular disease)       metoprolol tartrate 25 MG tablet    LOPRESSOR    180 tablet    Take 1 tablet (25 mg) by mouth 2 times daily    Essential hypertension, ASCVD (arteriosclerotic cardiovascular disease)       nitroGLYcerin 0.4 MG sublingual tablet    NITROSTAT    25 tablet    Place 1 tablet (0.4 mg) under the tongue every 5 minutes as needed for chest pain    ASCVD (arteriosclerotic cardiovascular disease)       RA VITAMIN B-12 TR 1000 MCG Tbcr   Generic drug:  cyanocobalamin      Take 1,000 mcg by mouth daily        simvastatin 20 MG tablet    ZOCOR    90 tablet    Take 1 tablet (20 mg) by mouth At Bedtime    Hyperlipidemia, unspecified hyperlipidemia type       vitamin D3 1000 units Caps      Take 1,000 Units by mouth daily

## 2018-05-11 ASSESSMENT — PATIENT HEALTH QUESTIONNAIRE - PHQ9: SUM OF ALL RESPONSES TO PHQ QUESTIONS 1-9: 0

## 2018-10-11 NOTE — TELEPHONE ENCOUNTER
Routing refill request to provider for review/approval because:  Drug not on the [facility] refill protocol     Last filled 12-28-17, due for refill. LOV 5-10-18. Alana Retana RN on 10/11/2018 at 3:08 PM

## 2019-01-01 ENCOUNTER — TRANSFERRED RECORDS (OUTPATIENT)
Dept: HEALTH INFORMATION MANAGEMENT | Facility: OTHER | Age: 84
End: 2019-01-01

## 2019-01-01 ENCOUNTER — HOSPITAL ENCOUNTER (INPATIENT)
Facility: OTHER | Age: 84
LOS: 6 days | Discharge: ANOTHER HOSPITAL | DRG: 292 | End: 2019-07-21
Attending: EMERGENCY MEDICINE | Admitting: INTERNAL MEDICINE
Payer: COMMERCIAL

## 2019-01-01 ENCOUNTER — APPOINTMENT (OUTPATIENT)
Dept: MRI IMAGING | Facility: OTHER | Age: 84
DRG: 292 | End: 2019-01-01
Attending: INTERNAL MEDICINE
Payer: COMMERCIAL

## 2019-01-01 ENCOUNTER — APPOINTMENT (OUTPATIENT)
Dept: CT IMAGING | Facility: OTHER | Age: 84
DRG: 292 | End: 2019-01-01
Attending: EMERGENCY MEDICINE
Payer: COMMERCIAL

## 2019-01-01 ENCOUNTER — APPOINTMENT (OUTPATIENT)
Dept: CT IMAGING | Facility: OTHER | Age: 84
DRG: 292 | End: 2019-01-01
Attending: INTERNAL MEDICINE
Payer: COMMERCIAL

## 2019-01-01 ENCOUNTER — TELEPHONE (OUTPATIENT)
Dept: INTERNAL MEDICINE | Facility: OTHER | Age: 84
End: 2019-01-01

## 2019-01-01 ENCOUNTER — OFFICE VISIT (OUTPATIENT)
Dept: INTERNAL MEDICINE | Facility: OTHER | Age: 84
DRG: 292 | End: 2019-01-01
Attending: INTERNAL MEDICINE
Payer: COMMERCIAL

## 2019-01-01 ENCOUNTER — APPOINTMENT (OUTPATIENT)
Dept: GENERAL RADIOLOGY | Facility: OTHER | Age: 84
DRG: 292 | End: 2019-01-01
Attending: FAMILY MEDICINE
Payer: COMMERCIAL

## 2019-01-01 ENCOUNTER — APPOINTMENT (OUTPATIENT)
Dept: CARDIOLOGY | Facility: OTHER | Age: 84
DRG: 292 | End: 2019-01-01
Attending: INTERNAL MEDICINE
Payer: COMMERCIAL

## 2019-01-01 ENCOUNTER — HOSPITAL ENCOUNTER (OUTPATIENT)
Facility: OTHER | Age: 84
Setting detail: OBSERVATION
Discharge: OTHER INSTITUTION | DRG: 292 | End: 2019-07-13
Attending: EMERGENCY MEDICINE | Admitting: INTERNAL MEDICINE
Payer: COMMERCIAL

## 2019-01-01 ENCOUNTER — APPOINTMENT (OUTPATIENT)
Dept: ULTRASOUND IMAGING | Facility: OTHER | Age: 84
DRG: 292 | End: 2019-01-01
Attending: FAMILY MEDICINE
Payer: COMMERCIAL

## 2019-01-01 ENCOUNTER — APPOINTMENT (OUTPATIENT)
Dept: GENERAL RADIOLOGY | Facility: OTHER | Age: 84
DRG: 292 | End: 2019-01-01
Attending: INTERNAL MEDICINE
Payer: COMMERCIAL

## 2019-01-01 ENCOUNTER — APPOINTMENT (OUTPATIENT)
Dept: GENERAL RADIOLOGY | Facility: OTHER | Age: 84
DRG: 292 | End: 2019-01-01
Attending: EMERGENCY MEDICINE
Payer: COMMERCIAL

## 2019-01-01 ENCOUNTER — HOSPITAL ENCOUNTER (INPATIENT)
Dept: ULTRASOUND IMAGING | Facility: OTHER | Age: 84
DRG: 292 | End: 2019-07-18
Attending: INTERNAL MEDICINE
Payer: COMMERCIAL

## 2019-01-01 VITALS
WEIGHT: 225.8 LBS | HEIGHT: 63 IN | HEART RATE: 69 BPM | TEMPERATURE: 98 F | DIASTOLIC BLOOD PRESSURE: 74 MMHG | OXYGEN SATURATION: 93 % | RESPIRATION RATE: 16 BRPM | BODY MASS INDEX: 40.01 KG/M2 | SYSTOLIC BLOOD PRESSURE: 151 MMHG

## 2019-01-01 VITALS
SYSTOLIC BLOOD PRESSURE: 154 MMHG | HEART RATE: 67 BPM | OXYGEN SATURATION: 94 % | TEMPERATURE: 98.2 F | DIASTOLIC BLOOD PRESSURE: 90 MMHG | RESPIRATION RATE: 18 BRPM

## 2019-01-01 VITALS
DIASTOLIC BLOOD PRESSURE: 48 MMHG | HEART RATE: 64 BPM | TEMPERATURE: 96.7 F | HEIGHT: 64 IN | WEIGHT: 236 LBS | SYSTOLIC BLOOD PRESSURE: 96 MMHG | RESPIRATION RATE: 16 BRPM | BODY MASS INDEX: 40.29 KG/M2 | OXYGEN SATURATION: 95 %

## 2019-01-01 DIAGNOSIS — R10.10 PAIN OF UPPER ABDOMEN: Primary | ICD-10-CM

## 2019-01-01 DIAGNOSIS — C79.9 METASTATIC CANCER (H): ICD-10-CM

## 2019-01-01 DIAGNOSIS — R74.8 ELEVATED LIVER ENZYMES: ICD-10-CM

## 2019-01-01 DIAGNOSIS — M62.81 GENERALIZED MUSCLE WEAKNESS: ICD-10-CM

## 2019-01-01 DIAGNOSIS — R16.0 LIVER MASS: ICD-10-CM

## 2019-01-01 DIAGNOSIS — R63.4 WEIGHT LOSS: ICD-10-CM

## 2019-01-01 DIAGNOSIS — E78.5 HYPERLIPIDEMIA, UNSPECIFIED HYPERLIPIDEMIA TYPE: ICD-10-CM

## 2019-01-01 DIAGNOSIS — R16.0 LIVER MASS: Primary | ICD-10-CM

## 2019-01-01 DIAGNOSIS — I50.9 ACUTE CONGESTIVE HEART FAILURE, UNSPECIFIED HEART FAILURE TYPE (H): ICD-10-CM

## 2019-01-01 DIAGNOSIS — Z87.891 PERSONAL HISTORY OF TOBACCO USE, PRESENTING HAZARDS TO HEALTH: ICD-10-CM

## 2019-01-01 DIAGNOSIS — R11.2 NAUSEA AND VOMITING, INTRACTABILITY OF VOMITING NOT SPECIFIED, UNSPECIFIED VOMITING TYPE: ICD-10-CM

## 2019-01-01 DIAGNOSIS — I11.0 HYPERTENSIVE HEART DISEASE WITH CONGESTIVE HEART FAILURE, UNSPECIFIED HEART FAILURE TYPE (H): ICD-10-CM

## 2019-01-01 DIAGNOSIS — I10 ESSENTIAL HYPERTENSION, BENIGN: ICD-10-CM

## 2019-01-01 DIAGNOSIS — R94.5 NONSPECIFIC ABNORMAL RESULTS OF LIVER FUNCTION STUDY: ICD-10-CM

## 2019-01-01 DIAGNOSIS — R06.02 SOB (SHORTNESS OF BREATH): ICD-10-CM

## 2019-01-01 DIAGNOSIS — N17.9 AKI (ACUTE KIDNEY INJURY) (H): ICD-10-CM

## 2019-01-01 DIAGNOSIS — N17.9 ACUTE KIDNEY INJURY (H): ICD-10-CM

## 2019-01-01 DIAGNOSIS — I50.9 ACUTE HEART FAILURE, UNSPECIFIED HEART FAILURE TYPE (H): ICD-10-CM

## 2019-01-01 DIAGNOSIS — K74.60 CIRRHOSIS OF LIVER WITHOUT ASCITES, UNSPECIFIED HEPATIC CIRRHOSIS TYPE (H): ICD-10-CM

## 2019-01-01 DIAGNOSIS — K74.69 OTHER CIRRHOSIS OF LIVER (H): ICD-10-CM

## 2019-01-01 LAB
AFP SERPL-MCNC: <1.5 UG/L (ref 0–8)
ALBUMIN SERPL-MCNC: 2.7 G/DL (ref 3.5–5.7)
ALBUMIN SERPL-MCNC: 2.7 G/DL (ref 3.5–5.7)
ALBUMIN SERPL-MCNC: 2.9 G/DL (ref 3.5–5.7)
ALBUMIN SERPL-MCNC: 3 G/DL (ref 3.5–5.7)
ALBUMIN SERPL-MCNC: 3 G/DL (ref 3.5–5.7)
ALBUMIN SERPL-MCNC: 3.1 G/DL (ref 3.5–5.7)
ALBUMIN SERPL-MCNC: 3.7 G/DL (ref 3.5–5.7)
ALBUMIN UR-MCNC: ABNORMAL MG/DL
ALBUMIN UR-MCNC: ABNORMAL MG/DL
ALP SERPL-CCNC: 198 U/L (ref 34–104)
ALP SERPL-CCNC: 226 U/L (ref 34–104)
ALP SERPL-CCNC: 234 U/L (ref 34–104)
ALP SERPL-CCNC: 254 U/L (ref 34–104)
ALP SERPL-CCNC: 263 U/L (ref 34–104)
ALP SERPL-CCNC: 282 U/L (ref 34–104)
ALP SERPL-CCNC: 292 U/L (ref 34–104)
ALT SERPL W P-5'-P-CCNC: 61 U/L (ref 7–52)
ALT SERPL W P-5'-P-CCNC: 65 U/L (ref 7–52)
ALT SERPL W P-5'-P-CCNC: 66 U/L (ref 7–52)
ALT SERPL W P-5'-P-CCNC: 67 U/L (ref 7–52)
ALT SERPL W P-5'-P-CCNC: 68 U/L (ref 7–52)
ALT SERPL W P-5'-P-CCNC: 75 U/L (ref 7–52)
ALT SERPL W P-5'-P-CCNC: 76 U/L (ref 7–52)
ANION GAP SERPL CALCULATED.3IONS-SCNC: 10 MMOL/L (ref 3–14)
ANION GAP SERPL CALCULATED.3IONS-SCNC: 11 MMOL/L (ref 3–14)
ANION GAP SERPL CALCULATED.3IONS-SCNC: 15 MMOL/L (ref 3–14)
ANION GAP SERPL CALCULATED.3IONS-SCNC: 16 MMOL/L (ref 3–14)
ANION GAP SERPL CALCULATED.3IONS-SCNC: 9 MMOL/L (ref 3–14)
APPEARANCE UR: CLEAR
APPEARANCE UR: CLEAR
AST SERPL W P-5'-P-CCNC: 104 U/L (ref 13–39)
AST SERPL W P-5'-P-CCNC: 112 U/L (ref 13–39)
AST SERPL W P-5'-P-CCNC: 120 U/L (ref 13–39)
AST SERPL W P-5'-P-CCNC: 132 U/L (ref 13–39)
AST SERPL W P-5'-P-CCNC: 135 U/L (ref 13–39)
AST SERPL W P-5'-P-CCNC: 143 U/L (ref 13–39)
AST SERPL W P-5'-P-CCNC: 154 U/L (ref 13–39)
BACTERIA #/AREA URNS HPF: ABNORMAL /HPF
BACTERIA #/AREA URNS HPF: ABNORMAL /HPF
BASOPHILS # BLD AUTO: 0 10E9/L (ref 0–0.2)
BASOPHILS # BLD AUTO: 0.1 10E9/L (ref 0–0.2)
BASOPHILS NFR BLD AUTO: 0.6 %
BASOPHILS NFR BLD AUTO: 0.7 %
BASOPHILS NFR BLD AUTO: 0.8 %
BASOPHILS NFR BLD AUTO: 0.9 %
BILIRUB SERPL-MCNC: 3.8 MG/DL (ref 0.3–1)
BILIRUB SERPL-MCNC: 4 MG/DL (ref 0.3–1)
BILIRUB SERPL-MCNC: 4.3 MG/DL (ref 0.3–1)
BILIRUB SERPL-MCNC: 5.2 MG/DL (ref 0.3–1)
BILIRUB SERPL-MCNC: 6 MG/DL (ref 0.3–1)
BILIRUB SERPL-MCNC: 6.1 MG/DL (ref 0.3–1)
BILIRUB SERPL-MCNC: 7 MG/DL (ref 0.3–1)
BILIRUB UR QL STRIP: ABNORMAL
BILIRUB UR QL STRIP: ABNORMAL
BUN SERPL-MCNC: 14 MG/DL (ref 7–25)
BUN SERPL-MCNC: 15 MG/DL (ref 7–25)
BUN SERPL-MCNC: 15 MG/DL (ref 7–25)
BUN SERPL-MCNC: 20 MG/DL (ref 7–25)
BUN SERPL-MCNC: 24 MG/DL (ref 7–25)
BUN SERPL-MCNC: 27 MG/DL (ref 7–25)
BUN SERPL-MCNC: 29 MG/DL (ref 7–25)
BUN SERPL-MCNC: 29 MG/DL (ref 7–25)
BUN SERPL-MCNC: 34 MG/DL (ref 7–25)
BUN SERPL-MCNC: 45 MG/DL (ref 7–25)
CALCIUM SERPL-MCNC: 10.8 MG/DL (ref 8.6–10.3)
CALCIUM SERPL-MCNC: 7.8 MG/DL (ref 8.6–10.3)
CALCIUM SERPL-MCNC: 7.9 MG/DL (ref 8.6–10.3)
CALCIUM SERPL-MCNC: 8.1 MG/DL (ref 8.6–10.3)
CALCIUM SERPL-MCNC: 8.5 MG/DL (ref 8.6–10.3)
CALCIUM SERPL-MCNC: 8.6 MG/DL (ref 8.6–10.3)
CALCIUM SERPL-MCNC: 8.9 MG/DL (ref 8.6–10.3)
CALCIUM SERPL-MCNC: 9.1 MG/DL (ref 8.6–10.3)
CALCIUM SERPL-MCNC: 9.1 MG/DL (ref 8.6–10.3)
CALCIUM SERPL-MCNC: 9.2 MG/DL (ref 8.6–10.3)
CEA SERPL-MCNC: <3 NG/ML
CHLORIDE SERPL-SCNC: 101 MMOL/L (ref 98–107)
CHLORIDE SERPL-SCNC: 104 MMOL/L (ref 98–107)
CHLORIDE SERPL-SCNC: 104 MMOL/L (ref 98–107)
CHLORIDE SERPL-SCNC: 95 MMOL/L (ref 98–107)
CHLORIDE SERPL-SCNC: 96 MMOL/L (ref 98–107)
CHLORIDE SERPL-SCNC: 98 MMOL/L (ref 98–107)
CHLORIDE SERPL-SCNC: 99 MMOL/L (ref 98–107)
CHLORIDE SERPL-SCNC: 99 MMOL/L (ref 98–107)
CO2 SERPL-SCNC: 16 MMOL/L (ref 21–31)
CO2 SERPL-SCNC: 19 MMOL/L (ref 21–31)
CO2 SERPL-SCNC: 23 MMOL/L (ref 21–31)
CO2 SERPL-SCNC: 24 MMOL/L (ref 21–31)
CO2 SERPL-SCNC: 24 MMOL/L (ref 21–31)
CO2 SERPL-SCNC: 25 MMOL/L (ref 21–31)
CO2 SERPL-SCNC: 27 MMOL/L (ref 21–31)
CO2 SERPL-SCNC: 28 MMOL/L (ref 21–31)
CO2 SERPL-SCNC: 28 MMOL/L (ref 21–31)
CO2 SERPL-SCNC: 31 MMOL/L (ref 21–31)
COLOR UR AUTO: ABNORMAL
COLOR UR AUTO: YELLOW
CREAT SERPL-MCNC: 0.95 MG/DL (ref 0.6–1.2)
CREAT SERPL-MCNC: 1.06 MG/DL (ref 0.6–1.2)
CREAT SERPL-MCNC: 1.13 MG/DL (ref 0.6–1.2)
CREAT SERPL-MCNC: 1.24 MG/DL (ref 0.6–1.2)
CREAT SERPL-MCNC: 1.65 MG/DL (ref 0.6–1.2)
CREAT SERPL-MCNC: 1.91 MG/DL (ref 0.6–1.2)
CREAT SERPL-MCNC: 2.06 MG/DL (ref 0.6–1.2)
CREAT SERPL-MCNC: 3.36 MG/DL (ref 0.6–1.2)
CREAT SERPL-MCNC: 4.37 MG/DL (ref 0.6–1.2)
CREAT SERPL-MCNC: 5.36 MG/DL (ref 0.6–1.2)
DIFFERENTIAL METHOD BLD: ABNORMAL
EOSINOPHIL # BLD AUTO: 0.1 10E9/L (ref 0–0.7)
EOSINOPHIL # BLD AUTO: 0.1 10E9/L (ref 0–0.7)
EOSINOPHIL # BLD AUTO: 0.3 10E9/L (ref 0–0.7)
EOSINOPHIL # BLD AUTO: 0.3 10E9/L (ref 0–0.7)
EOSINOPHIL NFR BLD AUTO: 0.8 %
EOSINOPHIL NFR BLD AUTO: 1.8 %
EOSINOPHIL NFR BLD AUTO: 3 %
EOSINOPHIL NFR BLD AUTO: 3 %
ERYTHROCYTE [DISTWIDTH] IN BLOOD BY AUTOMATED COUNT: 14.6 % (ref 10–15)
ERYTHROCYTE [DISTWIDTH] IN BLOOD BY AUTOMATED COUNT: 14.6 % (ref 10–15)
ERYTHROCYTE [DISTWIDTH] IN BLOOD BY AUTOMATED COUNT: 14.8 % (ref 10–15)
ERYTHROCYTE [DISTWIDTH] IN BLOOD BY AUTOMATED COUNT: 15.4 % (ref 10–15)
ERYTHROCYTE [DISTWIDTH] IN BLOOD BY AUTOMATED COUNT: 15.4 % (ref 10–15)
ERYTHROCYTE [DISTWIDTH] IN BLOOD BY AUTOMATED COUNT: 15.9 % (ref 10–15)
ERYTHROCYTE [DISTWIDTH] IN BLOOD BY AUTOMATED COUNT: 16.5 % (ref 10–15)
ERYTHROCYTE [DISTWIDTH] IN BLOOD BY AUTOMATED COUNT: 16.7 % (ref 10–15)
ERYTHROCYTE [DISTWIDTH] IN BLOOD BY AUTOMATED COUNT: 16.9 % (ref 10–15)
ERYTHROCYTE [DISTWIDTH] IN BLOOD BY AUTOMATED COUNT: 17.3 % (ref 10–15)
GFR SERPL CREATININE-BSD FRML MDRD: 10 ML/MIN/{1.73_M2}
GFR SERPL CREATININE-BSD FRML MDRD: 13 ML/MIN/{1.73_M2}
GFR SERPL CREATININE-BSD FRML MDRD: 23 ML/MIN/{1.73_M2}
GFR SERPL CREATININE-BSD FRML MDRD: 25 ML/MIN/{1.73_M2}
GFR SERPL CREATININE-BSD FRML MDRD: 30 ML/MIN/{1.73_M2}
GFR SERPL CREATININE-BSD FRML MDRD: 41 ML/MIN/{1.73_M2}
GFR SERPL CREATININE-BSD FRML MDRD: 46 ML/MIN/{1.73_M2}
GFR SERPL CREATININE-BSD FRML MDRD: 49 ML/MIN/{1.73_M2}
GFR SERPL CREATININE-BSD FRML MDRD: 56 ML/MIN/{1.73_M2}
GFR SERPL CREATININE-BSD FRML MDRD: 8 ML/MIN/{1.73_M2}
GLUCOSE SERPL-MCNC: 103 MG/DL (ref 70–105)
GLUCOSE SERPL-MCNC: 104 MG/DL (ref 70–105)
GLUCOSE SERPL-MCNC: 124 MG/DL (ref 70–105)
GLUCOSE SERPL-MCNC: 129 MG/DL (ref 70–105)
GLUCOSE SERPL-MCNC: 81 MG/DL (ref 70–105)
GLUCOSE SERPL-MCNC: 84 MG/DL (ref 70–105)
GLUCOSE SERPL-MCNC: 88 MG/DL (ref 70–105)
GLUCOSE SERPL-MCNC: 88 MG/DL (ref 70–105)
GLUCOSE SERPL-MCNC: 90 MG/DL (ref 70–105)
GLUCOSE SERPL-MCNC: 99 MG/DL (ref 70–105)
GLUCOSE UR STRIP-MCNC: 100 MG/DL
GLUCOSE UR STRIP-MCNC: NEGATIVE MG/DL
HBV SURFACE AG SERPL QL IA: NONREACTIVE
HCT VFR BLD AUTO: 35.5 % (ref 35–47)
HCT VFR BLD AUTO: 35.6 % (ref 35–47)
HCT VFR BLD AUTO: 36.2 % (ref 35–47)
HCT VFR BLD AUTO: 36.7 % (ref 35–47)
HCT VFR BLD AUTO: 36.8 % (ref 35–47)
HCT VFR BLD AUTO: 36.9 % (ref 35–47)
HCT VFR BLD AUTO: 37.4 % (ref 35–47)
HCT VFR BLD AUTO: 38 % (ref 35–47)
HCT VFR BLD AUTO: 38.4 % (ref 35–47)
HCT VFR BLD AUTO: 43.5 % (ref 35–47)
HCV AB SERPL QL IA: NONREACTIVE
HGB BLD-MCNC: 11 G/DL (ref 11.7–15.7)
HGB BLD-MCNC: 11.4 G/DL (ref 11.7–15.7)
HGB BLD-MCNC: 11.5 G/DL (ref 11.7–15.7)
HGB BLD-MCNC: 11.7 G/DL (ref 11.7–15.7)
HGB BLD-MCNC: 11.9 G/DL (ref 11.7–15.7)
HGB BLD-MCNC: 12.1 G/DL (ref 11.7–15.7)
HGB BLD-MCNC: 12.2 G/DL (ref 11.7–15.7)
HGB BLD-MCNC: 12.2 G/DL (ref 11.7–15.7)
HGB BLD-MCNC: 12.4 G/DL (ref 11.7–15.7)
HGB BLD-MCNC: 13.6 G/DL (ref 11.7–15.7)
HGB UR QL STRIP: NEGATIVE
HGB UR QL STRIP: NEGATIVE
IMM GRANULOCYTES # BLD: 0.1 10E9/L (ref 0–0.4)
IMM GRANULOCYTES # BLD: 0.2 10E9/L (ref 0–0.4)
IMM GRANULOCYTES # BLD: 0.4 10E9/L (ref 0–0.4)
IMM GRANULOCYTES # BLD: 0.4 10E9/L (ref 0–0.4)
IMM GRANULOCYTES NFR BLD: 1.2 %
IMM GRANULOCYTES NFR BLD: 2.3 %
IMM GRANULOCYTES NFR BLD: 3.9 %
IMM GRANULOCYTES NFR BLD: 4.1 %
INR PPP: 1.19 (ref 0–1.3)
KETONES UR STRIP-MCNC: ABNORMAL MG/DL
KETONES UR STRIP-MCNC: ABNORMAL MG/DL
LEUKOCYTE ESTERASE UR QL STRIP: NEGATIVE
LEUKOCYTE ESTERASE UR QL STRIP: NEGATIVE
LIPASE SERPL-CCNC: 19 U/L (ref 11–82)
LYMPHOCYTES # BLD AUTO: 1.2 10E9/L (ref 0.8–5.3)
LYMPHOCYTES # BLD AUTO: 1.3 10E9/L (ref 0.8–5.3)
LYMPHOCYTES # BLD AUTO: 1.4 10E9/L (ref 0.8–5.3)
LYMPHOCYTES # BLD AUTO: 1.9 10E9/L (ref 0.8–5.3)
LYMPHOCYTES NFR BLD AUTO: 14 %
LYMPHOCYTES NFR BLD AUTO: 14.9 %
LYMPHOCYTES NFR BLD AUTO: 18.6 %
LYMPHOCYTES NFR BLD AUTO: 19.2 %
MAGNESIUM SERPL-MCNC: 1.3 MG/DL (ref 1.9–2.7)
MAGNESIUM SERPL-MCNC: 2 MG/DL (ref 1.9–2.7)
MAGNESIUM SERPL-MCNC: 2 MG/DL (ref 1.9–2.7)
MAGNESIUM SERPL-MCNC: 2.1 MG/DL (ref 1.9–2.7)
MAGNESIUM SERPL-MCNC: 2.1 MG/DL (ref 1.9–2.7)
MAGNESIUM SERPL-MCNC: 2.2 MG/DL (ref 1.9–2.7)
MAGNESIUM SERPL-MCNC: 2.5 MG/DL (ref 1.9–2.7)
MCH RBC QN AUTO: 32.1 PG (ref 26.5–33)
MCH RBC QN AUTO: 32.2 PG (ref 26.5–33)
MCH RBC QN AUTO: 32.4 PG (ref 26.5–33)
MCH RBC QN AUTO: 32.5 PG (ref 26.5–33)
MCH RBC QN AUTO: 32.6 PG (ref 26.5–33)
MCH RBC QN AUTO: 32.8 PG (ref 26.5–33)
MCH RBC QN AUTO: 33.2 PG (ref 26.5–33)
MCH RBC QN AUTO: 33.2 PG (ref 26.5–33)
MCHC RBC AUTO-ENTMCNC: 30.9 G/DL (ref 31.5–36.5)
MCHC RBC AUTO-ENTMCNC: 31.1 G/DL (ref 31.5–36.5)
MCHC RBC AUTO-ENTMCNC: 31.3 G/DL (ref 31.5–36.5)
MCHC RBC AUTO-ENTMCNC: 31.7 G/DL (ref 31.5–36.5)
MCHC RBC AUTO-ENTMCNC: 31.8 G/DL (ref 31.5–36.5)
MCHC RBC AUTO-ENTMCNC: 32.3 G/DL (ref 31.5–36.5)
MCHC RBC AUTO-ENTMCNC: 32.4 G/DL (ref 31.5–36.5)
MCHC RBC AUTO-ENTMCNC: 32.6 G/DL (ref 31.5–36.5)
MCHC RBC AUTO-ENTMCNC: 32.9 G/DL (ref 31.5–36.5)
MCHC RBC AUTO-ENTMCNC: 33.2 G/DL (ref 31.5–36.5)
MCV RBC AUTO: 100 FL (ref 78–100)
MCV RBC AUTO: 101 FL (ref 78–100)
MCV RBC AUTO: 102 FL (ref 78–100)
MCV RBC AUTO: 102 FL (ref 78–100)
MCV RBC AUTO: 104 FL (ref 78–100)
MCV RBC AUTO: 104 FL (ref 78–100)
MCV RBC AUTO: 106 FL (ref 78–100)
MCV RBC AUTO: 99 FL (ref 78–100)
MONOCYTES # BLD AUTO: 0.8 10E9/L (ref 0–1.3)
MONOCYTES # BLD AUTO: 0.9 10E9/L (ref 0–1.3)
MONOCYTES # BLD AUTO: 0.9 10E9/L (ref 0–1.3)
MONOCYTES # BLD AUTO: 1 10E9/L (ref 0–1.3)
MONOCYTES NFR BLD AUTO: 10.4 %
MONOCYTES NFR BLD AUTO: 10.8 %
MONOCYTES NFR BLD AUTO: 9.7 %
MONOCYTES NFR BLD AUTO: 9.8 %
NEUTROPHILS # BLD AUTO: 4.7 10E9/L (ref 1.6–8.3)
NEUTROPHILS # BLD AUTO: 6 10E9/L (ref 1.6–8.3)
NEUTROPHILS # BLD AUTO: 6.1 10E9/L (ref 1.6–8.3)
NEUTROPHILS # BLD AUTO: 6.9 10E9/L (ref 1.6–8.3)
NEUTROPHILS NFR BLD AUTO: 65.3 %
NEUTROPHILS NFR BLD AUTO: 66.9 %
NEUTROPHILS NFR BLD AUTO: 68.5 %
NEUTROPHILS NFR BLD AUTO: 68.8 %
NITRATE UR QL: NEGATIVE
NITRATE UR QL: POSITIVE
NON-SQ EPI CELLS #/AREA URNS LPF: ABNORMAL /LPF
NT-PROBNP SERPL-MCNC: 678 PG/ML (ref 0–100)
PH UR STRIP: 5 PH (ref 5–9)
PH UR STRIP: 5 PH (ref 5–9)
PLATELET # BLD AUTO: 117 10E9/L (ref 150–450)
PLATELET # BLD AUTO: 123 10E9/L (ref 150–450)
PLATELET # BLD AUTO: 127 10E9/L (ref 150–450)
PLATELET # BLD AUTO: 127 10E9/L (ref 150–450)
PLATELET # BLD AUTO: 135 10E9/L (ref 150–450)
PLATELET # BLD AUTO: 137 10E9/L (ref 150–450)
PLATELET # BLD AUTO: 140 10E9/L (ref 150–450)
PLATELET # BLD AUTO: 147 10E9/L (ref 150–450)
PLATELET # BLD AUTO: 91 10E9/L (ref 150–450)
PLATELET # BLD AUTO: 96 10E9/L (ref 150–450)
POTASSIUM SERPL-SCNC: 3.4 MMOL/L (ref 3.5–5.1)
POTASSIUM SERPL-SCNC: 3.5 MMOL/L (ref 3.5–5.1)
POTASSIUM SERPL-SCNC: 3.6 MMOL/L (ref 3.5–5.1)
POTASSIUM SERPL-SCNC: 3.7 MMOL/L (ref 3.5–5.1)
POTASSIUM SERPL-SCNC: 3.8 MMOL/L (ref 3.5–5.1)
POTASSIUM SERPL-SCNC: 3.8 MMOL/L (ref 3.5–5.1)
POTASSIUM SERPL-SCNC: 4.2 MMOL/L (ref 3.5–5.1)
POTASSIUM SERPL-SCNC: 4.9 MMOL/L (ref 3.5–5.1)
PROT SERPL-MCNC: 5.6 G/DL (ref 6.4–8.9)
PROT SERPL-MCNC: 5.6 G/DL (ref 6.4–8.9)
PROT SERPL-MCNC: 5.7 G/DL (ref 6.4–8.9)
PROT SERPL-MCNC: 5.9 G/DL (ref 6.4–8.9)
PROT SERPL-MCNC: 6.1 G/DL (ref 6.4–8.9)
PROT SERPL-MCNC: 6.1 G/DL (ref 6.4–8.9)
PROT SERPL-MCNC: 7.1 G/DL (ref 6.4–8.9)
RBC # BLD AUTO: 3.43 10E12/L (ref 3.8–5.2)
RBC # BLD AUTO: 3.48 10E12/L (ref 3.8–5.2)
RBC # BLD AUTO: 3.53 10E12/L (ref 3.8–5.2)
RBC # BLD AUTO: 3.58 10E12/L (ref 3.8–5.2)
RBC # BLD AUTO: 3.61 10E12/L (ref 3.8–5.2)
RBC # BLD AUTO: 3.67 10E12/L (ref 3.8–5.2)
RBC # BLD AUTO: 3.71 10E12/L (ref 3.8–5.2)
RBC # BLD AUTO: 3.79 10E12/L (ref 3.8–5.2)
RBC # BLD AUTO: 3.82 10E12/L (ref 3.8–5.2)
RBC # BLD AUTO: 4.17 10E12/L (ref 3.8–5.2)
RBC #/AREA URNS AUTO: ABNORMAL /HPF
RBC #/AREA URNS AUTO: ABNORMAL /HPF
SODIUM SERPL-SCNC: 130 MMOL/L (ref 134–144)
SODIUM SERPL-SCNC: 132 MMOL/L (ref 134–144)
SODIUM SERPL-SCNC: 133 MMOL/L (ref 134–144)
SODIUM SERPL-SCNC: 134 MMOL/L (ref 134–144)
SODIUM SERPL-SCNC: 135 MMOL/L (ref 134–144)
SODIUM SERPL-SCNC: 136 MMOL/L (ref 134–144)
SODIUM SERPL-SCNC: 137 MMOL/L (ref 134–144)
SODIUM SERPL-SCNC: 138 MMOL/L (ref 134–144)
SOURCE: ABNORMAL
SOURCE: ABNORMAL
SP GR UR STRIP: 1.02 (ref 1–1.03)
SP GR UR STRIP: 1.02 (ref 1–1.03)
UROBILINOGEN UR STRIP-ACNC: 1 EU/DL (ref 0.2–1)
UROBILINOGEN UR STRIP-ACNC: 2 EU/DL (ref 0.2–1)
WBC # BLD AUTO: 10 10E9/L (ref 4–11)
WBC # BLD AUTO: 10.4 10E9/L (ref 4–11)
WBC # BLD AUTO: 12.1 10E9/L (ref 4–11)
WBC # BLD AUTO: 12.2 10E9/L (ref 4–11)
WBC # BLD AUTO: 7.3 10E9/L (ref 4–11)
WBC # BLD AUTO: 7.5 10E9/L (ref 4–11)
WBC # BLD AUTO: 8.9 10E9/L (ref 4–11)
WBC # BLD AUTO: 9 10E9/L (ref 4–11)
WBC # BLD AUTO: 9.2 10E9/L (ref 4–11)
WBC # BLD AUTO: 9.5 10E9/L (ref 4–11)
WBC #/AREA URNS AUTO: ABNORMAL /HPF
WBC #/AREA URNS AUTO: ABNORMAL /HPF

## 2019-01-01 PROCEDURE — 83735 ASSAY OF MAGNESIUM: CPT | Performed by: INTERNAL MEDICINE

## 2019-01-01 PROCEDURE — 36415 COLL VENOUS BLD VENIPUNCTURE: CPT | Performed by: INTERNAL MEDICINE

## 2019-01-01 PROCEDURE — 71046 X-RAY EXAM CHEST 2 VIEWS: CPT

## 2019-01-01 PROCEDURE — 12000000 ZZH R&B MED SURG/OB

## 2019-01-01 PROCEDURE — 88307 TISSUE EXAM BY PATHOLOGIST: CPT

## 2019-01-01 PROCEDURE — 85025 COMPLETE CBC W/AUTO DIFF WBC: CPT | Performed by: EMERGENCY MEDICINE

## 2019-01-01 PROCEDURE — G0499 HEPB SCREEN HIGH RISK INDIV: HCPCS | Performed by: INTERNAL MEDICINE

## 2019-01-01 PROCEDURE — 25000132 ZZH RX MED GY IP 250 OP 250 PS 637: Performed by: INTERNAL MEDICINE

## 2019-01-01 PROCEDURE — 82105 ALPHA-FETOPROTEIN SERUM: CPT | Performed by: INTERNAL MEDICINE

## 2019-01-01 PROCEDURE — 85610 PROTHROMBIN TIME: CPT | Performed by: INTERNAL MEDICINE

## 2019-01-01 PROCEDURE — 25000128 H RX IP 250 OP 636: Performed by: FAMILY MEDICINE

## 2019-01-01 PROCEDURE — 25000131 ZZH RX MED GY IP 250 OP 636 PS 637: Performed by: INTERNAL MEDICINE

## 2019-01-01 PROCEDURE — 25800030 ZZH RX IP 258 OP 636: Performed by: INTERNAL MEDICINE

## 2019-01-01 PROCEDURE — 71045 X-RAY EXAM CHEST 1 VIEW: CPT

## 2019-01-01 PROCEDURE — 93306 TTE W/DOPPLER COMPLETE: CPT | Mod: 26 | Performed by: INTERNAL MEDICINE

## 2019-01-01 PROCEDURE — 99232 SBSQ HOSP IP/OBS MODERATE 35: CPT | Performed by: FAMILY MEDICINE

## 2019-01-01 PROCEDURE — 40000275 ZZH STATISTIC RCP TIME EA 10 MIN

## 2019-01-01 PROCEDURE — 99220 ZZC INITIAL OBSERVATION CARE,LEVL III: CPT | Performed by: INTERNAL MEDICINE

## 2019-01-01 PROCEDURE — 99285 EMERGENCY DEPT VISIT HI MDM: CPT | Mod: Z6 | Performed by: EMERGENCY MEDICINE

## 2019-01-01 PROCEDURE — 36415 COLL VENOUS BLD VENIPUNCTURE: CPT | Mod: ZL | Performed by: INTERNAL MEDICINE

## 2019-01-01 PROCEDURE — G0378 HOSPITAL OBSERVATION PER HR: HCPCS

## 2019-01-01 PROCEDURE — 25000128 H RX IP 250 OP 636: Performed by: INTERNAL MEDICINE

## 2019-01-01 PROCEDURE — 36415 COLL VENOUS BLD VENIPUNCTURE: CPT | Performed by: EMERGENCY MEDICINE

## 2019-01-01 PROCEDURE — 99285 EMERGENCY DEPT VISIT HI MDM: CPT | Mod: 25 | Performed by: EMERGENCY MEDICINE

## 2019-01-01 PROCEDURE — 74178 CT ABD&PLV WO CNTR FLWD CNTR: CPT

## 2019-01-01 PROCEDURE — 85027 COMPLETE CBC AUTOMATED: CPT | Performed by: INTERNAL MEDICINE

## 2019-01-01 PROCEDURE — 25000132 ZZH RX MED GY IP 250 OP 250 PS 637: Performed by: FAMILY MEDICINE

## 2019-01-01 PROCEDURE — 80053 COMPREHEN METABOLIC PANEL: CPT | Performed by: INTERNAL MEDICINE

## 2019-01-01 PROCEDURE — 99217 ZZC OBSERVATION CARE DISCHARGE: CPT | Performed by: INTERNAL MEDICINE

## 2019-01-01 PROCEDURE — G0463 HOSPITAL OUTPT CLINIC VISIT: HCPCS

## 2019-01-01 PROCEDURE — 99232 SBSQ HOSP IP/OBS MODERATE 35: CPT | Performed by: INTERNAL MEDICINE

## 2019-01-01 PROCEDURE — 83880 ASSAY OF NATRIURETIC PEPTIDE: CPT | Performed by: EMERGENCY MEDICINE

## 2019-01-01 PROCEDURE — 25000125 ZZHC RX 250: Performed by: INTERNAL MEDICINE

## 2019-01-01 PROCEDURE — 93005 ELECTROCARDIOGRAM TRACING: CPT

## 2019-01-01 PROCEDURE — 82378 CARCINOEMBRYONIC ANTIGEN: CPT | Performed by: INTERNAL MEDICINE

## 2019-01-01 PROCEDURE — 25000128 H RX IP 250 OP 636: Performed by: EMERGENCY MEDICINE

## 2019-01-01 PROCEDURE — 74181 MRI ABDOMEN W/O CONTRAST: CPT

## 2019-01-01 PROCEDURE — 83735 ASSAY OF MAGNESIUM: CPT | Performed by: FAMILY MEDICINE

## 2019-01-01 PROCEDURE — 86803 HEPATITIS C AB TEST: CPT | Performed by: INTERNAL MEDICINE

## 2019-01-01 PROCEDURE — 93010 ELECTROCARDIOGRAM REPORT: CPT | Performed by: INTERNAL MEDICINE

## 2019-01-01 PROCEDURE — 99239 HOSP IP/OBS DSCHRG MGMT >30: CPT | Performed by: INTERNAL MEDICINE

## 2019-01-01 PROCEDURE — 80053 COMPREHEN METABOLIC PANEL: CPT | Performed by: EMERGENCY MEDICINE

## 2019-01-01 PROCEDURE — 99223 1ST HOSP IP/OBS HIGH 75: CPT | Performed by: INTERNAL MEDICINE

## 2019-01-01 PROCEDURE — 25800030 ZZH RX IP 258 OP 636: Performed by: FAMILY MEDICINE

## 2019-01-01 PROCEDURE — 85025 COMPLETE CBC W/AUTO DIFF WBC: CPT | Mod: 91 | Performed by: EMERGENCY MEDICINE

## 2019-01-01 PROCEDURE — 71260 CT THORAX DX C+: CPT

## 2019-01-01 PROCEDURE — 36415 COLL VENOUS BLD VENIPUNCTURE: CPT | Mod: 91 | Performed by: EMERGENCY MEDICINE

## 2019-01-01 PROCEDURE — 25000132 ZZH RX MED GY IP 250 OP 250 PS 637: Performed by: EMERGENCY MEDICINE

## 2019-01-01 PROCEDURE — 80053 COMPREHEN METABOLIC PANEL: CPT | Mod: ZL | Performed by: INTERNAL MEDICINE

## 2019-01-01 PROCEDURE — 83690 ASSAY OF LIPASE: CPT | Mod: ZL | Performed by: INTERNAL MEDICINE

## 2019-01-01 PROCEDURE — 25500064 ZZH RX 255 OP 636: Performed by: INTERNAL MEDICINE

## 2019-01-01 PROCEDURE — 76942 ECHO GUIDE FOR BIOPSY: CPT

## 2019-01-01 PROCEDURE — 85025 COMPLETE CBC W/AUTO DIFF WBC: CPT | Mod: ZL | Performed by: INTERNAL MEDICINE

## 2019-01-01 PROCEDURE — 99215 OFFICE O/P EST HI 40 MIN: CPT | Performed by: INTERNAL MEDICINE

## 2019-01-01 PROCEDURE — 93306 TTE W/DOPPLER COMPLETE: CPT

## 2019-01-01 PROCEDURE — 99233 SBSQ HOSP IP/OBS HIGH 50: CPT | Performed by: FAMILY MEDICINE

## 2019-01-01 PROCEDURE — 96374 THER/PROPH/DIAG INJ IV PUSH: CPT | Mod: XU | Performed by: EMERGENCY MEDICINE

## 2019-01-01 PROCEDURE — 80048 BASIC METABOLIC PNL TOTAL CA: CPT | Performed by: EMERGENCY MEDICINE

## 2019-01-01 PROCEDURE — 36415 COLL VENOUS BLD VENIPUNCTURE: CPT | Performed by: FAMILY MEDICINE

## 2019-01-01 PROCEDURE — 80048 BASIC METABOLIC PNL TOTAL CA: CPT | Performed by: FAMILY MEDICINE

## 2019-01-01 PROCEDURE — 81001 URINALYSIS AUTO W/SCOPE: CPT | Performed by: EMERGENCY MEDICINE

## 2019-01-01 PROCEDURE — 74176 CT ABD & PELVIS W/O CONTRAST: CPT

## 2019-01-01 PROCEDURE — 85027 COMPLETE CBC AUTOMATED: CPT | Performed by: FAMILY MEDICINE

## 2019-01-01 PROCEDURE — 93005 ELECTROCARDIOGRAM TRACING: CPT | Performed by: EMERGENCY MEDICINE

## 2019-01-01 PROCEDURE — 76770 US EXAM ABDO BACK WALL COMP: CPT

## 2019-01-01 PROCEDURE — 36416 COLLJ CAPILLARY BLOOD SPEC: CPT | Performed by: FAMILY MEDICINE

## 2019-01-01 PROCEDURE — 99231 SBSQ HOSP IP/OBS SF/LOW 25: CPT | Performed by: INTERNAL MEDICINE

## 2019-01-01 PROCEDURE — 81001 URINALYSIS AUTO W/SCOPE: CPT | Mod: ZL | Performed by: INTERNAL MEDICINE

## 2019-01-01 PROCEDURE — 99225 ZZC SUBSEQUENT OBSERVATION CARE,LEVEL II: CPT | Performed by: INTERNAL MEDICINE

## 2019-01-01 PROCEDURE — 85025 COMPLETE CBC W/AUTO DIFF WBC: CPT | Performed by: INTERNAL MEDICINE

## 2019-01-01 RX ORDER — METOCLOPRAMIDE HYDROCHLORIDE 5 MG/ML
5 INJECTION INTRAMUSCULAR; INTRAVENOUS EVERY 6 HOURS PRN
Status: DISCONTINUED | OUTPATIENT
Start: 2019-01-01 | End: 2019-01-01 | Stop reason: HOSPADM

## 2019-01-01 RX ORDER — NALOXONE HYDROCHLORIDE 0.4 MG/ML
.1-.4 INJECTION, SOLUTION INTRAMUSCULAR; INTRAVENOUS; SUBCUTANEOUS
Status: DISCONTINUED | OUTPATIENT
Start: 2019-01-01 | End: 2019-01-01 | Stop reason: HOSPADM

## 2019-01-01 RX ORDER — METOCLOPRAMIDE 5 MG/1
5 TABLET ORAL EVERY 6 HOURS PRN
Status: DISCONTINUED | OUTPATIENT
Start: 2019-01-01 | End: 2019-01-01 | Stop reason: HOSPADM

## 2019-01-01 RX ORDER — MAGNESIUM CARB/ALUMINUM HYDROX 105-160MG
148 TABLET,CHEWABLE ORAL
Status: DISCONTINUED | OUTPATIENT
Start: 2019-01-01 | End: 2019-01-01 | Stop reason: HOSPADM

## 2019-01-01 RX ORDER — ONDANSETRON 4 MG/1
4 TABLET, ORALLY DISINTEGRATING ORAL EVERY 6 HOURS PRN
Status: DISCONTINUED | OUTPATIENT
Start: 2019-01-01 | End: 2019-01-01 | Stop reason: HOSPADM

## 2019-01-01 RX ORDER — TRAMADOL HYDROCHLORIDE 50 MG/1
50 TABLET ORAL EVERY 8 HOURS PRN
Status: ON HOLD | COMMUNITY
Start: 2019-01-01 | End: 2019-01-01

## 2019-01-01 RX ORDER — DORZOLAMIDE HYDROCHLORIDE AND TIMOLOL MALEATE 20; 5 MG/ML; MG/ML
1 SOLUTION/ DROPS OPHTHALMIC 2 TIMES DAILY
Status: DISCONTINUED | OUTPATIENT
Start: 2019-01-01 | End: 2019-01-01 | Stop reason: HOSPADM

## 2019-01-01 RX ORDER — ASPIRIN 81 MG/1
81 TABLET ORAL DAILY
Status: DISCONTINUED | OUTPATIENT
Start: 2019-01-01 | End: 2019-01-01 | Stop reason: HOSPADM

## 2019-01-01 RX ORDER — LORATADINE 10 MG/1
10 TABLET ORAL DAILY
Status: DISCONTINUED | OUTPATIENT
Start: 2019-01-01 | End: 2019-01-01 | Stop reason: HOSPADM

## 2019-01-01 RX ORDER — IODIXANOL 320 MG/ML
100 INJECTION, SOLUTION INTRAVASCULAR ONCE
Status: COMPLETED | OUTPATIENT
Start: 2019-01-01 | End: 2019-01-01

## 2019-01-01 RX ORDER — DORZOLAMIDE HYDROCHLORIDE AND TIMOLOL MALEATE 20; 5 MG/ML; MG/ML
1 SOLUTION/ DROPS OPHTHALMIC 2 TIMES DAILY
Status: DISCONTINUED | OUTPATIENT
Start: 2019-01-01 | End: 2019-01-01

## 2019-01-01 RX ORDER — SODIUM CHLORIDE 9 MG/ML
INJECTION, SOLUTION INTRAVENOUS CONTINUOUS
Status: DISCONTINUED | OUTPATIENT
Start: 2019-01-01 | End: 2019-01-01

## 2019-01-01 RX ORDER — MAGNESIUM SULFATE HEPTAHYDRATE 40 MG/ML
4 INJECTION, SOLUTION INTRAVENOUS EVERY 4 HOURS PRN
Status: DISCONTINUED | OUTPATIENT
Start: 2019-01-01 | End: 2019-01-01 | Stop reason: HOSPADM

## 2019-01-01 RX ORDER — ACETAMINOPHEN 325 MG/1
650 TABLET ORAL EVERY 6 HOURS PRN
Status: DISCONTINUED | OUTPATIENT
Start: 2019-01-01 | End: 2019-01-01

## 2019-01-01 RX ORDER — LIDOCAINE 40 MG/G
CREAM TOPICAL
Status: DISCONTINUED | OUTPATIENT
Start: 2019-01-01 | End: 2019-01-01 | Stop reason: HOSPADM

## 2019-01-01 RX ORDER — ACETAMINOPHEN 325 MG/1
650 TABLET ORAL EVERY 4 HOURS PRN
Status: DISCONTINUED | OUTPATIENT
Start: 2019-01-01 | End: 2019-01-01 | Stop reason: HOSPADM

## 2019-01-01 RX ORDER — METOPROLOL TARTRATE 25 MG/1
25 TABLET, FILM COATED ORAL 2 TIMES DAILY
Status: DISCONTINUED | OUTPATIENT
Start: 2019-01-01 | End: 2019-01-01

## 2019-01-01 RX ORDER — SODIUM CHLORIDE 9 MG/ML
75 INJECTION, SOLUTION INTRAVENOUS CONTINUOUS
Status: DISCONTINUED | OUTPATIENT
Start: 2019-01-01 | End: 2019-01-01 | Stop reason: HOSPADM

## 2019-01-01 RX ORDER — FUROSEMIDE 20 MG
20 TABLET ORAL ONCE
Status: DISCONTINUED | OUTPATIENT
Start: 2019-01-01 | End: 2019-01-01

## 2019-01-01 RX ORDER — ACETAMINOPHEN 650 MG/1
650 SUPPOSITORY RECTAL EVERY 4 HOURS PRN
Status: DISCONTINUED | OUTPATIENT
Start: 2019-01-01 | End: 2019-01-01 | Stop reason: HOSPADM

## 2019-01-01 RX ORDER — FUROSEMIDE 10 MG/ML
40 INJECTION INTRAMUSCULAR; INTRAVENOUS ONCE
Status: COMPLETED | OUTPATIENT
Start: 2019-01-01 | End: 2019-01-01

## 2019-01-01 RX ORDER — PROMETHAZINE HYDROCHLORIDE 25 MG/1
25 TABLET ORAL EVERY 6 HOURS PRN
Status: DISCONTINUED | OUTPATIENT
Start: 2019-01-01 | End: 2019-01-01 | Stop reason: HOSPADM

## 2019-01-01 RX ORDER — POTASSIUM CHLORIDE 1500 MG/1
20-40 TABLET, EXTENDED RELEASE ORAL
Status: DISCONTINUED | OUTPATIENT
Start: 2019-01-01 | End: 2019-01-01 | Stop reason: HOSPADM

## 2019-01-01 RX ORDER — PROCHLORPERAZINE 25 MG
12.5 SUPPOSITORY, RECTAL RECTAL EVERY 12 HOURS PRN
Status: DISCONTINUED | OUTPATIENT
Start: 2019-01-01 | End: 2019-01-01 | Stop reason: HOSPADM

## 2019-01-01 RX ORDER — FUROSEMIDE 10 MG/ML
80 INJECTION INTRAMUSCULAR; INTRAVENOUS ONCE
Status: COMPLETED | OUTPATIENT
Start: 2019-01-01 | End: 2019-01-01

## 2019-01-01 RX ORDER — ONDANSETRON 2 MG/ML
4 INJECTION INTRAMUSCULAR; INTRAVENOUS EVERY 6 HOURS PRN
Status: DISCONTINUED | OUTPATIENT
Start: 2019-01-01 | End: 2019-01-01 | Stop reason: HOSPADM

## 2019-01-01 RX ORDER — ACETAMINOPHEN 500 MG
1000 TABLET ORAL DAILY PRN
COMMUNITY

## 2019-01-01 RX ORDER — FAMOTIDINE 20 MG/1
20 TABLET, FILM COATED ORAL DAILY
Status: DISCONTINUED | OUTPATIENT
Start: 2019-01-01 | End: 2019-01-01 | Stop reason: HOSPADM

## 2019-01-01 RX ORDER — FAMOTIDINE 20 MG/1
20 TABLET, FILM COATED ORAL 2 TIMES DAILY
Status: DISCONTINUED | OUTPATIENT
Start: 2019-01-01 | End: 2019-01-01 | Stop reason: DRUGHIGH

## 2019-01-01 RX ORDER — INFLUENZA A VIRUS A/VICTORIA/4897/2022 IVR-238 (H1N1) ANTIGEN (FORMALDEHYDE INACTIVATED), INFLUENZA A VIRUS A/CALIFORNIA/122/2022 SAN-022 (H3N2) ANTIGEN (FORMALDEHYDE INACTIVATED), AND INFLUENZA B VIRUS B/MICHIGAN/01/2021 ANTIGEN (FORMALDEHYDE INACTIVATED) 60; 60; 60 UG/.5ML; UG/.5ML; UG/.5ML
INJECTION, SUSPENSION INTRAMUSCULAR
Status: ON HOLD | COMMUNITY
Start: 2018-01-01 | End: 2019-01-01

## 2019-01-01 RX ORDER — SODIUM CHLORIDE 9 MG/ML
1000 INJECTION, SOLUTION INTRAVENOUS CONTINUOUS
Status: DISCONTINUED | OUTPATIENT
Start: 2019-01-01 | End: 2019-01-01

## 2019-01-01 RX ORDER — CLOBETASOL PROPIONATE 0.5 MG/G
OINTMENT TOPICAL 2 TIMES DAILY PRN
COMMUNITY

## 2019-01-01 RX ORDER — CALCIUM CARBONATE 500 MG/1
1000 TABLET, CHEWABLE ORAL 4 TIMES DAILY PRN
Status: DISCONTINUED | OUTPATIENT
Start: 2019-01-01 | End: 2019-01-01 | Stop reason: HOSPADM

## 2019-01-01 RX ORDER — METOPROLOL TARTRATE 25 MG/1
25 TABLET, FILM COATED ORAL 2 TIMES DAILY
Status: DISCONTINUED | OUTPATIENT
Start: 2019-01-01 | End: 2019-01-01 | Stop reason: HOSPADM

## 2019-01-01 RX ORDER — ONDANSETRON 4 MG/1
4 TABLET, ORALLY DISINTEGRATING ORAL EVERY 6 HOURS PRN
Status: DISCONTINUED | OUTPATIENT
Start: 2019-01-01 | End: 2019-01-01

## 2019-01-01 RX ORDER — PROCHLORPERAZINE MALEATE 5 MG
5 TABLET ORAL EVERY 6 HOURS PRN
Status: DISCONTINUED | OUTPATIENT
Start: 2019-01-01 | End: 2019-01-01 | Stop reason: HOSPADM

## 2019-01-01 RX ORDER — ONDANSETRON 2 MG/ML
4 INJECTION INTRAMUSCULAR; INTRAVENOUS EVERY 6 HOURS PRN
Status: DISCONTINUED | OUTPATIENT
Start: 2019-01-01 | End: 2019-01-01

## 2019-01-01 RX ORDER — FUROSEMIDE 40 MG
40 TABLET ORAL ONCE
Status: COMPLETED | OUTPATIENT
Start: 2019-01-01 | End: 2019-01-01

## 2019-01-01 RX ORDER — ONDANSETRON 2 MG/ML
4 INJECTION INTRAMUSCULAR; INTRAVENOUS ONCE
Status: COMPLETED | OUTPATIENT
Start: 2019-01-01 | End: 2019-01-01

## 2019-01-01 RX ORDER — LOSARTAN POTASSIUM 50 MG/1
50 TABLET ORAL DAILY
Status: DISCONTINUED | OUTPATIENT
Start: 2019-01-01 | End: 2019-01-01

## 2019-01-01 RX ORDER — LATANOPROST 50 UG/ML
1 SOLUTION/ DROPS OPHTHALMIC EVERY EVENING
Status: DISCONTINUED | OUTPATIENT
Start: 2019-01-01 | End: 2019-01-01

## 2019-01-01 RX ORDER — AMOXICILLIN 250 MG
2 CAPSULE ORAL 2 TIMES DAILY PRN
Status: DISCONTINUED | OUTPATIENT
Start: 2019-01-01 | End: 2019-01-01 | Stop reason: HOSPADM

## 2019-01-01 RX ORDER — FUROSEMIDE 10 MG/ML
20 INJECTION INTRAMUSCULAR; INTRAVENOUS ONCE
Status: COMPLETED | OUTPATIENT
Start: 2019-01-01 | End: 2019-01-01

## 2019-01-01 RX ORDER — LATANOPROST 50 UG/ML
1 SOLUTION/ DROPS OPHTHALMIC EVERY EVENING
Status: DISCONTINUED | OUTPATIENT
Start: 2019-01-01 | End: 2019-01-01 | Stop reason: HOSPADM

## 2019-01-01 RX ORDER — SODIUM CHLORIDE 9 MG/ML
75 INJECTION, SOLUTION INTRAVENOUS CONTINUOUS
DISCHARGE
Start: 2019-01-01

## 2019-01-01 RX ORDER — POLYETHYLENE GLYCOL 3350 17 G/17G
17 POWDER, FOR SOLUTION ORAL DAILY PRN
Status: DISCONTINUED | OUTPATIENT
Start: 2019-01-01 | End: 2019-01-01 | Stop reason: HOSPADM

## 2019-01-01 RX ORDER — POTASSIUM CHLORIDE 7.45 MG/ML
10 INJECTION INTRAVENOUS
Status: DISCONTINUED | OUTPATIENT
Start: 2019-01-01 | End: 2019-01-01 | Stop reason: HOSPADM

## 2019-01-01 RX ORDER — AMOXICILLIN 250 MG
1 CAPSULE ORAL 2 TIMES DAILY PRN
Status: DISCONTINUED | OUTPATIENT
Start: 2019-01-01 | End: 2019-01-01 | Stop reason: HOSPADM

## 2019-01-01 RX ADMIN — SODIUM CHLORIDE: 9 INJECTION, SOLUTION INTRAVENOUS at 15:50

## 2019-01-01 RX ADMIN — ACETAMINOPHEN 650 MG: 325 TABLET, FILM COATED ORAL at 03:02

## 2019-01-01 RX ADMIN — SODIUM CHLORIDE: 9 INJECTION, SOLUTION INTRAVENOUS at 18:43

## 2019-01-01 RX ADMIN — ACETAMINOPHEN 650 MG: 325 TABLET, FILM COATED ORAL at 22:52

## 2019-01-01 RX ADMIN — METOPROLOL TARTRATE 25 MG: 25 TABLET ORAL at 10:01

## 2019-01-01 RX ADMIN — SODIUM CHLORIDE: 9 INJECTION, SOLUTION INTRAVENOUS at 02:44

## 2019-01-01 RX ADMIN — SODIUM CHLORIDE: 9 INJECTION, SOLUTION INTRAVENOUS at 03:48

## 2019-01-01 RX ADMIN — PROMETHAZINE HYDROCHLORIDE 25 MG: 25 TABLET ORAL at 17:32

## 2019-01-01 RX ADMIN — LORATADINE 10 MG: 10 TABLET ORAL at 09:45

## 2019-01-01 RX ADMIN — SODIUM CHLORIDE: 9 INJECTION, SOLUTION INTRAVENOUS at 06:38

## 2019-01-01 RX ADMIN — METOPROLOL 12.5 MG: 25 TABLET ORAL at 21:35

## 2019-01-01 RX ADMIN — FAMOTIDINE 20 MG: 20 TABLET ORAL at 12:06

## 2019-01-01 RX ADMIN — METOPROLOL 12.5 MG: 25 TABLET ORAL at 23:01

## 2019-01-01 RX ADMIN — PROCHLORPERAZINE EDISYLATE 5 MG: 5 INJECTION INTRAMUSCULAR; INTRAVENOUS at 12:33

## 2019-01-01 RX ADMIN — FAMOTIDINE 20 MG: 20 TABLET ORAL at 09:26

## 2019-01-01 RX ADMIN — LORATADINE 10 MG: 10 TABLET ORAL at 10:22

## 2019-01-01 RX ADMIN — IODIXANOL 100 ML: 320 INJECTION, SOLUTION INTRAVASCULAR at 10:13

## 2019-01-01 RX ADMIN — ONDANSETRON 4 MG: 4 TABLET, ORALLY DISINTEGRATING ORAL at 19:41

## 2019-01-01 RX ADMIN — ONDANSETRON 4 MG: 2 INJECTION INTRAMUSCULAR; INTRAVENOUS at 13:38

## 2019-01-01 RX ADMIN — FAMOTIDINE 20 MG: 20 TABLET ORAL at 09:02

## 2019-01-01 RX ADMIN — ONDANSETRON 4 MG: 4 TABLET, ORALLY DISINTEGRATING ORAL at 19:54

## 2019-01-01 RX ADMIN — ONDANSETRON 4 MG: 4 TABLET, ORALLY DISINTEGRATING ORAL at 15:50

## 2019-01-01 RX ADMIN — LATANOPROST 1 DROP: 50 SOLUTION/ DROPS OPHTHALMIC at 22:58

## 2019-01-01 RX ADMIN — DORZOLAMIDE HYDROCHLORIDE AND TIMOLOL MALEATE 1 DROP: 20; 5 SOLUTION/ DROPS OPHTHALMIC at 09:26

## 2019-01-01 RX ADMIN — METOPROLOL TARTRATE 25 MG: 25 TABLET ORAL at 22:56

## 2019-01-01 RX ADMIN — ACETAMINOPHEN 650 MG: 325 TABLET, FILM COATED ORAL at 02:28

## 2019-01-01 RX ADMIN — LORATADINE 10 MG: 10 TABLET ORAL at 10:23

## 2019-01-01 RX ADMIN — METOPROLOL TARTRATE 25 MG: 25 TABLET ORAL at 22:49

## 2019-01-01 RX ADMIN — METOPROLOL TARTRATE 25 MG: 25 TABLET ORAL at 09:26

## 2019-01-01 RX ADMIN — LATANOPROST 1 DROP: 50 SOLUTION/ DROPS OPHTHALMIC at 22:50

## 2019-01-01 RX ADMIN — FUROSEMIDE 40 MG: 10 INJECTION, SOLUTION INTRAVENOUS at 10:31

## 2019-01-01 RX ADMIN — METOPROLOL TARTRATE 25 MG: 25 TABLET ORAL at 12:05

## 2019-01-01 RX ADMIN — METOPROLOL TARTRATE 25 MG: 25 TABLET ORAL at 21:25

## 2019-01-01 RX ADMIN — ACETAMINOPHEN 650 MG: 325 TABLET, FILM COATED ORAL at 23:01

## 2019-01-01 RX ADMIN — METOPROLOL 12.5 MG: 25 TABLET ORAL at 09:45

## 2019-01-01 RX ADMIN — FAMOTIDINE 20 MG: 20 TABLET ORAL at 10:23

## 2019-01-01 RX ADMIN — ACETAMINOPHEN 650 MG: 325 TABLET, FILM COATED ORAL at 22:04

## 2019-01-01 RX ADMIN — DORZOLAMIDE HYDROCHLORIDE AND TIMOLOL MALEATE 1 DROP: 20; 5 SOLUTION/ DROPS OPHTHALMIC at 23:01

## 2019-01-01 RX ADMIN — LORATADINE 10 MG: 10 TABLET ORAL at 12:05

## 2019-01-01 RX ADMIN — DORZOLAMIDE HYDROCHLORIDE AND TIMOLOL MALEATE 1 DROP: 20; 5 SOLUTION/ DROPS OPHTHALMIC at 10:23

## 2019-01-01 RX ADMIN — MAGNESIUM SULFATE HEPTAHYDRATE 4 G: 40 INJECTION, SOLUTION INTRAVENOUS at 14:27

## 2019-01-01 RX ADMIN — FUROSEMIDE 80 MG: 10 INJECTION, SOLUTION INTRAVENOUS at 10:44

## 2019-01-01 RX ADMIN — LATANOPROST 1 DROP: 50 SOLUTION/ DROPS OPHTHALMIC at 21:36

## 2019-01-01 RX ADMIN — ACETAMINOPHEN 650 MG: 325 TABLET, FILM COATED ORAL at 01:11

## 2019-01-01 RX ADMIN — DORZOLAMIDE HYDROCHLORIDE AND TIMOLOL MALEATE 1 DROP: 20; 5 SOLUTION/ DROPS OPHTHALMIC at 10:37

## 2019-01-01 RX ADMIN — LATANOPROST 1 DROP: 50 SOLUTION/ DROPS OPHTHALMIC at 23:02

## 2019-01-01 RX ADMIN — CALCIUM CARBONATE 1000 MG: 500 TABLET ORAL at 12:06

## 2019-01-01 RX ADMIN — ASPIRIN 81 MG: 81 TABLET, DELAYED RELEASE ORAL at 09:02

## 2019-01-01 RX ADMIN — FAMOTIDINE 20 MG: 20 TABLET ORAL at 10:03

## 2019-01-01 RX ADMIN — ASPIRIN 81 MG: 81 TABLET, DELAYED RELEASE ORAL at 09:26

## 2019-01-01 RX ADMIN — FAMOTIDINE 20 MG: 20 TABLET ORAL at 10:22

## 2019-01-01 RX ADMIN — METOPROLOL TARTRATE 25 MG: 25 TABLET ORAL at 22:14

## 2019-01-01 RX ADMIN — DORZOLAMIDE HYDROCHLORIDE AND TIMOLOL MALEATE 1 DROP: 20; 5 SOLUTION/ DROPS OPHTHALMIC at 21:36

## 2019-01-01 RX ADMIN — PROMETHAZINE HYDROCHLORIDE 25 MG: 25 TABLET ORAL at 14:31

## 2019-01-01 RX ADMIN — SODIUM CHLORIDE: 9 INJECTION, SOLUTION INTRAVENOUS at 05:42

## 2019-01-01 RX ADMIN — DORZOLAMIDE HYDROCHLORIDE AND TIMOLOL MALEATE 1 DROP: 20; 5 SOLUTION/ DROPS OPHTHALMIC at 10:02

## 2019-01-01 RX ADMIN — METOPROLOL TARTRATE 25 MG: 25 TABLET ORAL at 21:04

## 2019-01-01 RX ADMIN — LOSARTAN POTASSIUM 50 MG: 50 TABLET, FILM COATED ORAL at 10:04

## 2019-01-01 RX ADMIN — LIDOCAINE HYDROCHLORIDE 10 ML: 10 INJECTION, SOLUTION INFILTRATION; PERINEURAL at 11:51

## 2019-01-01 RX ADMIN — SODIUM CHLORIDE 1000 ML: 9 INJECTION, SOLUTION INTRAVENOUS at 13:37

## 2019-01-01 RX ADMIN — ACETAMINOPHEN 650 MG: 325 TABLET, FILM COATED ORAL at 23:18

## 2019-01-01 RX ADMIN — METOPROLOL 12.5 MG: 25 TABLET ORAL at 12:34

## 2019-01-01 RX ADMIN — LOSARTAN POTASSIUM 50 MG: 50 TABLET, FILM COATED ORAL at 09:26

## 2019-01-01 RX ADMIN — LATANOPROST 1 DROP: 50 SOLUTION/ DROPS OPHTHALMIC at 22:19

## 2019-01-01 RX ADMIN — METOPROLOL TARTRATE 25 MG: 25 TABLET ORAL at 09:02

## 2019-01-01 RX ADMIN — SODIUM CHLORIDE: 9 INJECTION, SOLUTION INTRAVENOUS at 13:00

## 2019-01-01 RX ADMIN — DORZOLAMIDE HYDROCHLORIDE AND TIMOLOL MALEATE 1 DROP: 20; 5 SOLUTION/ DROPS OPHTHALMIC at 22:16

## 2019-01-01 RX ADMIN — DORZOLAMIDE HYDROCHLORIDE AND TIMOLOL MALEATE 1 DROP: 20; 5 SOLUTION/ DROPS OPHTHALMIC at 14:26

## 2019-01-01 RX ADMIN — FAMOTIDINE 20 MG: 20 TABLET ORAL at 09:45

## 2019-01-01 RX ADMIN — FUROSEMIDE 20 MG: 10 INJECTION, SOLUTION INTRAMUSCULAR; INTRAVENOUS at 01:12

## 2019-01-01 RX ADMIN — ONDANSETRON 4 MG: 4 TABLET, ORALLY DISINTEGRATING ORAL at 02:33

## 2019-01-01 RX ADMIN — DORZOLAMIDE HYDROCHLORIDE AND TIMOLOL MALEATE 1 DROP: 20; 5 SOLUTION/ DROPS OPHTHALMIC at 22:47

## 2019-01-01 RX ADMIN — DORZOLAMIDE HYDROCHLORIDE AND TIMOLOL MALEATE 1 DROP: 20; 5 SOLUTION/ DROPS OPHTHALMIC at 09:48

## 2019-01-01 RX ADMIN — SODIUM CHLORIDE: 9 INJECTION, SOLUTION INTRAVENOUS at 11:50

## 2019-01-01 RX ADMIN — ACETAMINOPHEN 650 MG: 325 TABLET, FILM COATED ORAL at 21:04

## 2019-01-01 RX ADMIN — LORATADINE 10 MG: 10 TABLET ORAL at 10:03

## 2019-01-01 RX ADMIN — ONDANSETRON 4 MG: 2 INJECTION INTRAMUSCULAR; INTRAVENOUS at 08:13

## 2019-01-01 RX ADMIN — LOSARTAN POTASSIUM 50 MG: 50 TABLET, FILM COATED ORAL at 12:11

## 2019-01-01 RX ADMIN — LORATADINE 10 MG: 10 TABLET ORAL at 09:26

## 2019-01-01 RX ADMIN — Medication 75 ML/HR: at 10:37

## 2019-01-01 RX ADMIN — POLYETHYLENE GLYCOL 3350 17 G: 17 POWDER, FOR SOLUTION ORAL at 12:06

## 2019-01-01 RX ADMIN — FUROSEMIDE 40 MG: 10 INJECTION, SOLUTION INTRAVENOUS at 11:47

## 2019-01-01 RX ADMIN — DORZOLAMIDE HYDROCHLORIDE AND TIMOLOL MALEATE 1 DROP: 20; 5 SOLUTION/ DROPS OPHTHALMIC at 22:57

## 2019-01-01 RX ADMIN — ONDANSETRON 4 MG: 4 TABLET, ORALLY DISINTEGRATING ORAL at 17:25

## 2019-01-01 RX ADMIN — SODIUM CHLORIDE: 9 INJECTION, SOLUTION INTRAVENOUS at 23:42

## 2019-01-01 RX ADMIN — FUROSEMIDE 40 MG: 40 TABLET ORAL at 21:44

## 2019-01-01 RX ADMIN — ACETAMINOPHEN 650 MG: 325 TABLET, FILM COATED ORAL at 10:56

## 2019-01-01 ASSESSMENT — ACTIVITIES OF DAILY LIVING (ADL)
ADLS_ACUITY_SCORE: 13
TRANSFERRING: 0-->INDEPENDENT
ADLS_ACUITY_SCORE: 13
DRESS: 0-->INDEPENDENT
ADLS_ACUITY_SCORE: 13
ADLS_ACUITY_SCORE: 13
ADLS_ACUITY_SCORE: 15
ADLS_ACUITY_SCORE: 13
RETIRED_EATING: 0-->INDEPENDENT
ADLS_ACUITY_SCORE: 13
ADLS_ACUITY_SCORE: 15
BATHING: 0-->INDEPENDENT
ADLS_ACUITY_SCORE: 13
COGNITION: 0 - NO COGNITION ISSUES REPORTED
AMBULATION: 0-->INDEPENDENT
ADLS_ACUITY_SCORE: 13
SWALLOWING: 0-->SWALLOWS FOODS/LIQUIDS WITHOUT DIFFICULTY
RETIRED_COMMUNICATION: 0-->UNDERSTANDS/COMMUNICATES WITHOUT DIFFICULTY
ADLS_ACUITY_SCORE: 14
FALL_HISTORY_WITHIN_LAST_SIX_MONTHS: NO
TOILETING: 0-->INDEPENDENT
ADLS_ACUITY_SCORE: 13

## 2019-01-01 ASSESSMENT — ENCOUNTER SYMPTOMS
NAUSEA: 0
CHILLS: 0
SHORTNESS OF BREATH: 1
AGITATION: 0
WEAKNESS: 1
FEVER: 0
COUGH: 1
LIGHT-HEADEDNESS: 0
VOMITING: 0
ARTHRALGIAS: 0
CHEST TIGHTNESS: 0
DYSURIA: 0

## 2019-01-01 ASSESSMENT — MIFFLIN-ST. JEOR
SCORE: 1441.99
SCORE: 1435.64
SCORE: 1471.02
SCORE: 1443.35
SCORE: 1409.1
SCORE: 1431.1
SCORE: 1505.49
SCORE: 1409.79
SCORE: 1411.13

## 2019-01-01 ASSESSMENT — PAIN SCALES - GENERAL: PAINLEVEL: MILD PAIN (2)

## 2019-07-08 NOTE — TELEPHONE ENCOUNTER
Called and spoke with patient after proper verification. Patient placed in schedule.     Cassidy Austin LPN............. July 8, 2019 10:23 AM

## 2019-07-08 NOTE — TELEPHONE ENCOUNTER
Patient called for an appointment for stomach issues - nausea, gassiness, not being able to eat, not feeling well at all.  She states she called last Tuesday and was told to call back today as there were no appointments available then    There are no appointments available today or in the near future and she is not wanting to see another provider as she does not know anyone else or to wait long as she is not feeling well    Please call with a work in or to advise    Thank you

## 2019-07-11 PROBLEM — K74.60 CIRRHOSIS OF LIVER (H): Status: ACTIVE | Noted: 2019-01-01

## 2019-07-11 PROBLEM — N17.9 AKI (ACUTE KIDNEY INJURY) (H): Status: ACTIVE | Noted: 2019-01-01

## 2019-07-11 NOTE — ED NOTES
Pt went to bathroom and states she had diarrhea and thinks it may be from the contrast. Pt also reports there was blood in her stool but thinks it may be from her hemorrhoids. Pt then escorted to back to bed and then connected to the monitoring equipment.    Angle Chacon RN on 7/11/2019 at 4:19 PM

## 2019-07-11 NOTE — ED TRIAGE NOTES
Pt presents to ED from clinic. Pt has had abd pain in her upper abd for about 3 weeks. Pt had a work up done in clinic including urine and blood work. Pt is unable to have CT with contrast dye due to creatine level. TP reports nausea, blood was found in her urine.    Angle Chacon RN on 7/11/2019 at 1:15 PM

## 2019-07-11 NOTE — PROGRESS NOTES
" [facility] ADMISSION NOTE    Patient admitted to room 302 at approximately 1740 via wheel chair from emergency room. Patient was accompanied by daughter.     Verbal SBAR report received from [facility] prior to patient arrival.     Patient ambulated to bed with one assist. Patient alert and oriented X 3. The patient is not having any pain. 0-10 Pain Scale: 5. Admission vital signs: Blood pressure 154/74, pulse 69, temperature 96.9  F (36.1  C), temperature source Tympanic, resp. rate 18, height 1.6 m (5' 3\"), weight 99.2 kg (218 lb 11.1 oz), SpO2 93 %, not currently breastfeeding. Patient and daughter was oriented to plan of care, call light, bed controls, tv, telephone, bathroom and visiting hours.     Risk Assessment    The following safety risks were identified during admission: fall. Yellow risk band applied: YES.       Flaco Holt RN    "

## 2019-07-11 NOTE — NURSING NOTE
Patient presents to the clinic for abd pain that started 3 weeks ago.     Medication Reconciliation: complete   Cassidy Austin LPN............. July 11, 2019 11:32 AM

## 2019-07-11 NOTE — ED PROVIDER NOTES
[facility]  Emergency Department Visit Note    Abdominal Pain      History of Present Illness     HPI:  Karly Stephenson is an 84 year old female presenting with abdominal pain. This pain is greatest in the upper quadrant. These symptoms started 3 weeks ago. She has not had similar symptoms in the past. There isassociated nausea but no vomiting. No diarrhea or constipation. No chest pain, shortness of breath, fever, chills, dysuria, but she does complain of hematuria. There is no vaginal discharge or bleeding. She was seen in clinic today and LFTs are elevated and she has an JAIMEE, She is here for non IV study of abd/pelvis. Her UA was negative for RBCs and color likely related to elevated biulirubin    Medications:  Prior to Admission medications    Medication Sig Last Dose Taking? Auth Provider   aspirin EC 81 MG EC tablet Take 81 mg by mouth daily with food 7/11/2019 at Unknown time Yes Reported, Patient   clopidogrel (PLAVIX) 75 MG tablet Take 1 tablet (75 mg) by mouth daily 7/11/2019 at Unknown time Yes Sydney Viera DO   famotidine (PEPCID) 20 MG tablet Take 1 tablet (20 mg) by mouth 2 times daily 7/11/2019 at Unknown time Yes Sydney Viera DO   loratadine (CLARITIN) 10 MG tablet Take 1 tablet (10 mg) by mouth daily 7/10/2019 at Unknown time Yes Sydney Viera DO   losartan (COZAAR) 50 MG tablet Take 1 tablet (50 mg) by mouth daily 7/11/2019 at Unknown time Yes Sydeny Viera DO   metoprolol tartrate (LOPRESSOR) 25 MG tablet Take 1 tablet (25 mg) by mouth 2 times daily 7/11/2019 at Unknown time Yes Sydney Viera DO   simvastatin (ZOCOR) 20 MG tablet Take 1 tablet (20 mg) by mouth At Bedtime 7/10/2019 at Unknown time Yes Sydney Viera DO   acetaminophen (TYLENOL) 500 MG tablet Take 2 tablets by mouth daily . Max acetaminophen dose: 4,000 mg per 24 hours. Unknown at Unknown time  Reported, Patient   calcium citrate-vitamin D (CALCIUM CITRATE + D) 315-250 MG-UNIT TABS per tablet Take 2  tablets by mouth 2 times daily (with meals) Unknown at Unknown time  Reported, Patient   Cholecalciferol (VITAMIN D3) 1000 UNITS CAPS Take 1,000 Units by mouth daily Unknown at Unknown time  Reported, Patient   clobetasol (TEMOVATE) 0.05 % ointment APPLY  TOPICALLY TO AFFECTED AREA(S) 2 TIMES DAILY. Unknown at Unknown time  Avila Chou MD   cyanocobalamin (RA VITAMIN B-12 TR) 1000 MCG TBCR Take 1,000 mcg by mouth daily Unknown at Unknown time  Reported, Patient   docusate sodium (COLACE) 100 MG capsule Take 100 mg by mouth 2 times daily as needed for constipation Unknown at Unknown time  Reported, Patient   dorzolamide-timolol (COSOPT) 2-0.5 % ophthalmic solution Place 1 drop into both eyes 2 times daily Unknown at Unknown time  Reported, Patient   FLUZONE HIGH-DOSE 0.5 ML injection  Unknown at Unknown time  Reported, Patient   latanoprost (XALATAN) 0.005 % ophthalmic solution Place 1 drop into both eyes every evening Unknown at Unknown time  Reported, Patient   nitroGLYcerin (NITROSTAT) 0.4 MG sublingual tablet Place 1 tablet (0.4 mg) under the tongue every 5 minutes as needed for chest pain Unknown at Unknown time  Sydney Viera DO       Allergies:  Allergies   Allergen Reactions     Codeine Nausea     Doxycycline Other (See Comments)     jittery       Problem List:  Patient Active Problem List   Diagnosis     Coronary atherosclerosis     Essential hypertension     History of vulvar dysplasia     Hyperlipidemia     Obesity     Hypertonicity of bladder     Thrombocytopenia (H)     Vitamin B 12 deficiency     JAIMEE (acute kidney injury) (H)     Cirrhosis of liver (H)       Past Medical History:  Past Medical History:   Diagnosis Date     Allergic rhinitis      ASCVD (arteriosclerotic cardiovascular disease) 05/05/2010    STEMI  received TNK, Cor angio with Markedly ectatic proximal to mid dominant LCX with distal likely embolic OM3 culprit     Carcinoma in situ of vulva 2000     Initial CIS, subsequent ALEXANDER III,  "local excisions     Colon polyp 10/11/2005     Dermatitis, atopic 2018     Essential hypertension 2018     Glaucoma 2005     Hyperlipidemia      Hypertonic bladder      Low back pain      Obesity      Osteoarthritis      Phlebitis and thrombophlebitis     , 2012,legs     Thrombocytopenia (H)      Varicose veins of lower extremities with inflammation 2018     Vitamin B 12 deficiency 3/5/2014       Past Surgical History:  Past Surgical History:   Procedure Laterality Date     ANGIOGRAM  2010    coronary angio at [facility]     ARTHROPLASTY HIP      done after fracture     ARTHROPLASTY KNEE Bilateral      ARTHROSCOPY SHOULDER ROTATOR CUFF REPAIR Left      COLONOSCOPY  10/2005    tubular adenoma     EYE SURGERY  2008    laser surgery glaucoma; again in      TONSILLECTOMY, ADENOIDECTOMY, COMBINED       VULVECTOMY SIMPLE      CIS     VULVECTOMY SIMPLE  2005    ALEXANDER III       Social History:  Social History     Tobacco Use     Smoking status: Former Smoker     Packs/day: 0.50     Years: 30.00     Pack years: 15.00     Types: Cigarettes     Last attempt to quit: 1982     Years since quittin.5     Smokeless tobacco: Never Used   Substance Use Topics     Alcohol use: No     Alcohol/week: 0.0 oz     Drug use: No     Comment: Drug use: No       Review of Systems:  10 point review of systems obtained and pertinent positive and negative findings noted in HPI. Review of systems otherwise negative.    Physical Exam     Vital signs: /87   Pulse 70   Temp 98.4  F (36.9  C) (Tympanic)   Resp 22   Ht 1.588 m (5' 2.5\")   Wt 99.8 kg (220 lb)   SpO2 95%   Breastfeeding? No   BMI 39.60 kg/m      Physical Exam:    General: awake and alert, uncomfortable  HEENT: atraumatic, no scleral injection, no nasal discharge, neck supple  Chest: clear to auscultation bilaterally without wheezes or crackles, non labored respirations, symmetric chest rise  Cardiovascular: " regular rate and rhythm, no murmurs or gallops  Abdomen: soft, tender in RUQ, epigastric area. No hepatosplenomegaly. Negative [place] sign, no ascites, no rebound or guarding, nondistended  Extremities: no deformities, edema, or tenderness  Skin: warm, dry, no rashes  Neuro: alert and oriented x 3, moving extremities x 4, ambulates without difficulty      Medical Decision Making & ED Course     Karly Stephenson is an 84 year old female presenting with abdominal pain. Physical exam revealed RUQ and epigastric discomfort. With this history and exam the differential includes gastric reflux, peptic ulcer disease, pancreatitis, cholelithiasis, cholecystitis, diverticulitis, malignancy.    An IV was started and she was given a fluid bolus and zofran for nausea. CT scan showed miliary/nodular liver. I did speak with Dr Turner and best option is fluid hydration and repeat CT Chest/Abd/pelvis in the morning if GFR improves. Case discussed with the hospitalist and agrees with plan. Patient will be transferred to observation  I have reviewed the patients medical history , laboratory studies, medical imaging, and RCG    Diagnosis & Disposition     Diagnosis:  1. JAIMEE (acute kidney injury) (H)    2. Elevated liver enzymes      Disposition:  Transfer to observation    Dee Watts MD  07/11/19 [id number]

## 2019-07-11 NOTE — PROGRESS NOTES
Chief Complaint   Patient presents with     Abdominal Pain          Subjective:   Ms. Stephenson is a 84 year old female  seen for the acute concern today of abdominal pain.    She reports that approximately 3 weeks ago she started having some decreased energy, generalized feelings of sickness.  She developed some nausea and indigestion after eating.  She has not had any vomiting.  Approximately 2 weeks ago she developed significant pain.  She reports that over the last couple weeks it has moved in location and affected it more areas of her abdomen.  She has not had any significant change in her bowel movements although has had chronic constipation.  She denies any blood in her stool.  She noted some blood on her pad a few days ago but has not had any blood in her urine.  Today she now noticed some darkening of her urine when she gave a urine sample.  She has not had any urinary frequency.  She has had some pressure with urination.  She has not had any fevers.    She has had a 7 to 8 pound weight loss over the last week or 2.  She also has had some worsening decrease in energy and overall weakness.    She has her uterus, ovaries and gallbladder all in place.  Her last colonoscopy was in 2005.    She  reports that she quit smoking about 37 years ago. Her smoking use included cigarettes. She has a 15.00 pack-year smoking history. She has never used smokeless tobacco.  She does not drink any alcohol.    Past medical history reviewed as below:     Past Medical History:   Diagnosis Date     Allergic rhinitis      ASCVD (arteriosclerotic cardiovascular disease) 05/05/2010    STEMI  received TNK, Cor angio with Markedly ectatic proximal to mid dominant LCX with distal likely embolic OM3 culprit     Carcinoma in situ of vulva 2000     Initial CIS, subsequent ALEXANDER III, local excisions     Colon polyp 10/11/2005     Dermatitis, atopic 2/23/2018     Essential hypertension 2/23/2018     Glaucoma 2005     Hyperlipidemia       Hypertonic bladder      Low back pain      Obesity      Osteoarthritis      Phlebitis and thrombophlebitis     2007, 2012,legs     Thrombocytopenia (H)      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     Vitamin B 12 deficiency 3/5/2014   .      ROS:   Pertinent  ROS was performed and was negative, including for fevers, chills, chest pain, increased shortness of breath, increased lower extremity edema, changes in bowel or bladder, blood in the stool, difficulty swallowing, sores in the mouth. No other concerns, with exception of HPI above.      Objective:    /90 (BP Location: Right arm, Patient Position: Sitting, Cuff Size: Adult Large)   Pulse 67   Temp 98.2  F (36.8  C) (Tympanic)   Resp 18   SpO2 94%   Breastfeeding? No   GEN: Vitals reviewed.  Patient is in no acute distress. Cooperative with exam.  HEENT: Normocephalic atraumatic.  Pupils equally round.  No scleral icterus, no conjunctival erythema. Oropharynx with no erythema or exudates. Dentition adequate.  NECK: Supple; no thyromegaly.  No neck, cervical LAD.    CV: Heart regular in rate and rhythm with no murmur.   LUNGS: Lungs clear to auscultation bilaterally.  Chest rise equal bilaterally.  No accessory muscle use.  ABD:  Obese, Soft, mildly distended.  Occasional guarding.  Tender to palpation in the epigastrium and left upper quadrant.  And nondistended.  No rebound. Bowel sounds positive.  SKIN: Warm and dry to touch.  No rash on face, arms and legs.  EXT: No clubbing or cyanosis.   Trace to 1+ bilateral lower extremity peripheral edema.    LABS: 7/11/2019 - Personally ordered/reviewed  Results for orders placed or performed in visit on 07/11/19   *UA reflex to Microscopic   Result Value Ref Range    Color Urine Orange     Appearance Urine Clear     Glucose Urine Negative NEG^Negative mg/dL    Bilirubin Urine Moderate (A) NEG^Negative    Ketones Urine Trace (A) NEG^Negative mg/dL    Specific Gravity Urine 1.020 1.000 -  1.030    Blood Urine Negative NEG^Negative    pH Urine 5.0 5.0 - 9.0 pH    Protein Albumin Urine Trace (A) NEG^Negative mg/dL    Urobilinogen Urine 1.0 0.2 - 1.0 EU/dL    Nitrite Urine Positive (A) NEG^Negative    Leukocyte Esterase Urine Negative NEG^Negative    Source Midstream Urine    CBC and Differential   Result Value Ref Range    WBC 10.0 4.0 - 11.0 10e9/L    RBC Count 4.17 3.8 - 5.2 10e12/L    Hemoglobin 13.6 11.7 - 15.7 g/dL    Hematocrit 43.5 35.0 - 47.0 %     (H) 78 - 100 fl    MCH 32.6 26.5 - 33.0 pg    MCHC 31.3 (L) 31.5 - 36.5 g/dL    RDW 14.6 10.0 - 15.0 %    Platelet Count 135 (L) 150 - 450 10e9/L    Diff Method Automated Method     % Neutrophils 68.8 %    % Lymphocytes 18.6 %    % Monocytes 9.8 %    % Eosinophils 0.8 %    % Basophils 0.8 %    % Immature Granulocytes 1.2 %    Absolute Neutrophil 6.9 1.6 - 8.3 10e9/L    Absolute Lymphocytes 1.9 0.8 - 5.3 10e9/L    Absolute Monocytes 1.0 0.0 - 1.3 10e9/L    Absolute Eosinophils 0.1 0.0 - 0.7 10e9/L    Absolute Basophils 0.1 0.0 - 0.2 10e9/L    Abs Immature Granulocytes 0.1 0 - 0.4 10e9/L   Lipase   Result Value Ref Range    Lipase 19 11 - 82 U/L   Comprehensive Metabolic Panel   Result Value Ref Range    Sodium 138 134 - 144 mmol/L    Potassium 3.7 3.5 - 5.1 mmol/L    Chloride 99 98 - 107 mmol/L    Carbon Dioxide 28 21 - 31 mmol/L    Anion Gap 11 3 - 14 mmol/L    Glucose 129 (H) 70 - 105 mg/dL    Urea Nitrogen 29 (H) 7 - 25 mg/dL    Creatinine 1.91 (H) 0.60 - 1.20 mg/dL    GFR Estimate 25 (L) >60 mL/min/[1.73_m2]    GFR Estimate If Black 30 (L) >60 mL/min/[1.73_m2]    Calcium 10.8 (H) 8.6 - 10.3 mg/dL    Bilirubin Total 4.3 (H) 0.3 - 1.0 mg/dL    Albumin 3.7 3.5 - 5.7 g/dL    Protein Total 7.1 6.4 - 8.9 g/dL    Alkaline Phosphatase 234 (H) 34 - 104 U/L    ALT 76 (H) 7 - 52 U/L     (H) 13 - 39 U/L   Urine Microscopic   Result Value Ref Range    WBC Urine 0 - 5 OTO5^0 - 5 /HPF    RBC Urine O - 2 OTO2^O - 2 /HPF    Squamous Epithelial /LPF  Urine Few FEW^Few /LPF    Bacteria Urine Moderate (A) NEG^Negative /HPF           Assessment/Plan:   1. Pain of upper abdomen  -Etiology is unknown.  Labs below are checked today and do show some significant concern for elevated liver enzymes.  I suspect that the process causing this is also causing her pain.  Lipase is negative.  No significant sign of infection on her blood counts and with lack of fever.  - CBC and Differential  - Lipase  - Comprehensive Metabolic Panel  - Urine Microscopic    2. Weight loss  -Suspect this is secondary to nausea, vomiting and decreased oral intake.    3. Nausea and vomiting, intractability of vomiting not specified, unspecified vomiting type    4. Generalized muscle weakness    5. Acute kidney injury (H)  -Suspect this is due to volume depletion.  We had a long discussion today about treatment of this and I do think she would benefit from IV fluids given her generalized weakness in addition to the kidney injury which can be done in the ER while additional work-up is pursued even her elevated liver enzymes.      6. Elevated liver enzymes  -Etiology is unknown.  We had a long discussion today regarding this.  We discussed that this may be related to infection, stones, malignancy.  At this time I do think she would benefit from imaging, fluids and depending on results may require transfer to a facility with gastroenterology and the ability to pursue ERCP.  We discussed doing this as an outpatient versus evaluation through the ER.  She is interested in IV fluids and ER evaluation.  Her case was discussed with the ER provider and she will be transferred there at this time.    - Return/call as needed for follow-up should any new symptoms develop, for worsening of current symptoms or if symptoms do not resolve with above plan.    A total of 41 minutes spent with in face-to-face consultation of this patient with greater than 50% spent in counseling and care coordination of above listed  medical problems including diagnosis, treatment options with emphasis on risks and benefits of each,prognosis and importance of compliance for each.     WIL CAMPBELL DO   7/11/2019 11:42 AM    This document was prepared using voice generated softwear. While every attempt was made for accuracy, grammatical errors may exist.

## 2019-07-11 NOTE — H&P
[facility]    History and Physical  Hospitalist       Date of Admission:  7/11/2019    Assessment & Plan   Karly Stephenson is a 84 year old female who presents with weakness, nausea.    Principal Problem:    JAIMEE (acute kidney injury) (H)    Assessment: The patient's baseline creatinine is around 1.0, today she is 1.91.  I suspect she is prerenal, as her bilirubin, calcium and hemoglobin are all elevated above baseline.  She tells me she has not been eating or drinking well due to her symptoms of nausea and early satiety.    Plan: Observation status, aggressive IV fluid replacement overnight.  Repeat labs in the morning.  Hold nephrotoxic medication.      Cirrhosis of liver (H)    Assessment: Abnormal liver function tests and an abnormal noncontrast CT of the abdomen and pelvis.  It is difficult to characterize was going on in the liver without IV contrast.  Patient does not drink alcohol.  She does not use excessive amounts of acetaminophen.  Differential diagnosis includes abnormal LFTs from hypovolemia and a grossly normal liver versus cirrhosis or infiltrative process.    Plan: IV fluid replacement as noted above.  If her creatinine improves, CT of the abdomen and pelvis with IV contrast and oral contrast.    Hypercalcemia  Assessment: Mild, present on admission.  Probably prerenal.  Possibly associated with whatever liver process is going on.  Plan: IV fluids    Active Problems:    Coronary atherosclerosis    Assessment: Chronic, stable       Essential hypertension    Assessment: Chronic    Plan: Monitor, holding nephrotoxic meds    DVT Prophylaxis: Pneumatic Compression Devices  Code Status: No Order    Moe Leon    Primary Care Physician   Sydney Viera    Chief Complaint   Weakness, nausea    History is obtained from the patient and chart review.    History of Present Illness   Karly Stephenson is a 84 year old female who presents with 2 weeks of progressively worsening  weakness, nausea and early satiety.  She can remember no inciting event that occurred 3 to 4 weeks ago.  She has not traveled anywhere recently.  She complains of indigestion but no significant abdominal pain.  She did denies any shortness of breath but does feel quite weak and has exercise intolerance characterized more by weakness.  She tends to be more constipated.  She has not noticed any black or bloody stools.  No dysuria.  She does note that she has not been eating or drinking as much due to early satiety and nausea when she eats or drinks.  Her baseline creatinine is 0.8-1.1.  She has never had abdominal surgery.  She does not drink alcohol.  She takes 2 extra strength Tylenol at bedtime each night but rarely takes more than that during the day.  She has a history of smoking for a number of years but quit 20 years ago.    She was in the clinic today and due to her symptoms was sent to the emergency department.  In the ED kidney and liver tests are abnormal.  She had a noncontrast CT of the abdomen and pelvis which shows an unusual appearing liver.    Past Medical History    I have reviewed this patient's medical history and updated it with pertinent information if needed.   Past Medical History:   Diagnosis Date     Allergic rhinitis      ASCVD (arteriosclerotic cardiovascular disease) 05/05/2010    STEMI  received TNK, Cor angio with Markedly ectatic proximal to mid dominant LCX with distal likely embolic OM3 culprit     Carcinoma in situ of vulva 2000     Initial CIS, subsequent LAEXANDER III, local excisions     Colon polyp 10/11/2005     Dermatitis, atopic 2/23/2018     Essential hypertension 2/23/2018     Glaucoma 2005     Hyperlipidemia      Hypertonic bladder      Low back pain      Obesity      Osteoarthritis      Phlebitis and thrombophlebitis     2007, 2012,legs     Thrombocytopenia (H)      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     Vitamin B 12 deficiency 3/5/2014       Past  Surgical History   I have reviewed this patient's surgical history and updated it with pertinent information if needed.  Past Surgical History:   Procedure Laterality Date     ANGIOGRAM  05/2010    coronary angio at [facility]     ARTHROPLASTY HIP  1999    done after fracture     ARTHROPLASTY KNEE Bilateral 2000     ARTHROSCOPY SHOULDER ROTATOR CUFF REPAIR Left 2002     COLONOSCOPY  10/2005    tubular adenoma     EYE SURGERY  04/2008    laser surgery glaucoma; again in 2012     TONSILLECTOMY, ADENOIDECTOMY, COMBINED  1999     VULVECTOMY SIMPLE  2000    CIS     VULVECTOMY SIMPLE  06/2005    ALEXANDER III       Prior to Admission Medications   Prior to Admission Medications   Prescriptions Last Dose Informant Patient Reported? Taking?   Cholecalciferol (VITAMIN D3) 1000 UNITS CAPS Unknown at Unknown time  Yes No   Sig: Take 1,000 Units by mouth daily   FLUZONE HIGH-DOSE 0.5 ML injection Unknown at Unknown time  Yes No   acetaminophen (TYLENOL) 500 MG tablet Unknown at Unknown time  Yes No   Sig: Take 2 tablets by mouth daily . Max acetaminophen dose: 4,000 mg per 24 hours.   aspirin EC 81 MG EC tablet 7/11/2019 at Unknown time  Yes Yes   Sig: Take 81 mg by mouth daily with food   calcium citrate-vitamin D (CALCIUM CITRATE + D) 315-250 MG-UNIT TABS per tablet Unknown at Unknown time  Yes No   Sig: Take 2 tablets by mouth 2 times daily (with meals)   clobetasol (TEMOVATE) 0.05 % ointment Unknown at Unknown time  No No   Sig: APPLY  TOPICALLY TO AFFECTED AREA(S) 2 TIMES DAILY.   clopidogrel (PLAVIX) 75 MG tablet 7/11/2019 at Unknown time  No Yes   Sig: Take 1 tablet (75 mg) by mouth daily   cyanocobalamin (RA VITAMIN B-12 TR) 1000 MCG TBCR Unknown at Unknown time  Yes No   Sig: Take 1,000 mcg by mouth daily   docusate sodium (COLACE) 100 MG capsule Unknown at Unknown time  Yes No   Sig: Take 100 mg by mouth 2 times daily as needed for constipation   dorzolamide-timolol (COSOPT) 2-0.5 % ophthalmic solution Unknown at  Unknown time  Yes No   Sig: Place 1 drop into both eyes 2 times daily   famotidine (PEPCID) 20 MG tablet 2019 at Unknown time  No Yes   Sig: Take 1 tablet (20 mg) by mouth 2 times daily   latanoprost (XALATAN) 0.005 % ophthalmic solution Unknown at Unknown time  Yes No   Sig: Place 1 drop into both eyes every evening   loratadine (CLARITIN) 10 MG tablet 7/10/2019 at Unknown time  No Yes   Sig: Take 1 tablet (10 mg) by mouth daily   losartan (COZAAR) 50 MG tablet 2019 at Unknown time  No Yes   Sig: Take 1 tablet (50 mg) by mouth daily   metoprolol tartrate (LOPRESSOR) 25 MG tablet 2019 at Unknown time  No Yes   Sig: Take 1 tablet (25 mg) by mouth 2 times daily   nitroGLYcerin (NITROSTAT) 0.4 MG sublingual tablet Unknown at Unknown time  No No   Sig: Place 1 tablet (0.4 mg) under the tongue every 5 minutes as needed for chest pain   simvastatin (ZOCOR) 20 MG tablet 7/10/2019 at Unknown time  No Yes   Sig: Take 1 tablet (20 mg) by mouth At Bedtime      Facility-Administered Medications: None     Allergies   Allergies   Allergen Reactions     Codeine Nausea     Doxycycline Other (See Comments)     jittery       Social History   I have reviewed this patient's social history and updated it with pertinent information if needed. Karly Stephenson  reports that she quit smoking about 37 years ago. Her smoking use included cigarettes. She has a 15.00 pack-year smoking history. She has never used smokeless tobacco. She reports that she does not drink alcohol or use drugs.    Family History   I have reviewed this patient's family history and updated it with pertinent information if needed.   Family History   Problem Relation Age of Onset     Hypertension Father         Hypertension,h/o CVA.  in his 70s     Cancer Mother         Cancer, age 92, lung cancer     Breast Cancer Sister      Ulcerative Colitis Sister      Diabetes Sister      No Known Problems Brother        Review of Systems     REVIEW  OF SYSTEMS:    Constitutional: no energy or appetite, no recent sick contacts  Eyes: no changes in vision  Ears, nose, mouth, throat, and face: no mouth sores, dysphagia, or odynophagia  Respiratory: no shortness of breath, cough, or wheezing. No aspiration symptoms.   Cardiovascular: no chest pain, palpitations, orthopnea, increased lower extremity edema, or syncope.   Gastrointestinal: no constipation, diarrhea, complains of nausea, no vomiting or abdominal pain.  Genitourinary: no dysuria, hematuria, urgency or frequency.   Hematologic/lymphatic: no unintentional weight loss or night sweats.  Musculoskeletal: no pain to extremities or falls.   Neurological: no new weakness, tingling, numbness.   Psychiatric: no hallucinations or delusions.  Endocrine:  not a known diabetic.    Physical Exam   Temp: 98.4  F (36.9  C) Temp src: Tympanic BP: 169/87 Pulse: 70 Heart Rate: 71 Resp: 22 SpO2: 95 % O2 Device: None (Room air)    Vital Signs with Ranges  Temp:  [98.2  F (36.8  C)-98.4  F (36.9  C)] 98.4  F (36.9  C)  Pulse:  [66-70] 70  Heart Rate:  [71] 71  Resp:  [9-22] 22  BP: (154-205)/(87-92) 169/87  SpO2:  [94 %-96 %] 95 %  220 lbs 0 oz    Constitutional: In no apparent distress  Eyes: pupils reactive, extraocular movements intact. Icteric sclera.   HEENT: TM's normal, oropharynx nonerythematous. Neck supple, no JVD.  Respiratory: no crackles noted, no wheezes.  Cardiovascular: regular, no murmur. No lower extremity edema.  GI: soft, non-tender, bowel sounds present.  Lymph/Hematologic: no cervical or supraclavicular LAD.  Genitourinary: deferred  Skin: no rashes, or sores  Musculoskeletal: no joint erythema or swelling  Neurologic: cranial nerves symmetric. Neuro exam nonfocal  Psychiatric: alert and oriented x3. Interactive.       Data   Data reviewed today:  I personally reviewed the abdominal CT image(s) showing abnormal liver.  Recent Labs   Lab 07/11/19  1152   WBC 10.0   HGB 13.6   *   *       POTASSIUM 3.7   CHLORIDE 99   CO2 28   BUN 29*   CR 1.91*   ANIONGAP 11   ADRI 10.8*   *   ALBUMIN 3.7   PROTTOTAL 7.1   BILITOTAL 4.3*   ALKPHOS 234*   ALT 76*   *   LIPASE 19       Recent Results (from the past 24 hour(s))   CT Abdomen Pelvis w/o Contrast    Narrative    PROCEDURE:  CT ABDOMEN PELVIS W/O CONTRAST    HISTORY:  Abd pain, unspecified; Nausea, vomiting; Hematuria, unknown  cause; oral contrast only     TECHNIQUE:  Helical CT of the abdomen and pelvis was performed without  intravenous contrast. Sagittal and coronal reformatted images were  reviewed.    COMPARISON:  CT abdomen and pelvis 11/18/2013    FINDINGS:      There is atelectasis in both lung bases. Coronary artery  calcifications are noted.    The liver is diffusely heterogeneous in attenuation with a nodular  contour and the suggestion of multiple small nodules and/or masses.  This is incompletely assessed without IV contrast but does appear new  from the prior study. The noncontrast appearance of the pancreas,  adrenal glands, and spleen is unremarkable. The gallbladder is  present.    There are multiple bilateral renal cysts, including a large right  renal cyst measuring 10 cm in diameter. There is no hydronephrosis.     The bowel is normal in caliber. The appendix is unremarkable.     The aorta is normal in size with scattered atherosclerotic  calcifications.    No free fluid, free air or adenopathy is present.  Partially calcified  fibroid is again noted in the uterus, unchanged.    No suspicious osseous lesions are identified.      Impression    IMPRESSION:    1. Abnormal appearance of the uterus raises concern for malignancy  versus cirrhosis. This could be better assessed with IV contrast if  the patient's renal function improves.  2. Bilateral renal cysts. No hydronephrosis or renal calculi.    FARIDEH WISDOM MD

## 2019-07-11 NOTE — PROGRESS NOTES
Patient was brought back to their room, rails were up and call light was within reach.     Handoff procedure information verbally given to Fallon.

## 2019-07-12 NOTE — PLAN OF CARE
Problem: Adult Inpatient Plan of Care  Goal: Plan of Care Review  7/12/2019 0457 by Renetta Garza, RN  Note:   Pt requested PRN Tylenol at 0300 for 1/10 abdominal pain.Pt continues to have intermittent nausea and abdominal pain. Pt has a few drops of blood with stools, pt states she has Hx of hemorrhoids. VSS, fall precautions in place.   Renetta Garza, RN on 7/12/2019 at 4:59 AM

## 2019-07-12 NOTE — PLAN OF CARE
Problem: Adult Inpatient Plan of Care  Goal: Plan of Care Review  Note:   Pt A&O x4, has intermittent nausea, oral Zofran given at 1954. Pt was also eating a sandwich at that time. Pt is having small amounts of diarrhea throughout shift, no blood noted. Pt ambulating to bathroom with SBA. LS clear, HR regular, BS active. Pt denies pain, VSS, fall precautions in place.   Renetta Garza RN on 7/12/2019 at 12:40 AM

## 2019-07-12 NOTE — PROGRESS NOTES
Pt has low GFR 30 Ok per Dr. Turner to give pt 100 ml visipaque for CT C/A/P.  Allegra Ruiz on 7/12/2019 at 9:50 AM

## 2019-07-12 NOTE — PHARMACY-ADMISSION MEDICATION HISTORY
Pharmacy -- Admission Medication Reconciliation    Prior to admission (PTA) medications were reviewed and the patient's PTA medication list was updated.    Sources Consulted: [employer], patient  Faxed [facility]- no response at time of note    The reliability of this Medication Reconciliation is: Reliability: Reliable    The following significant changes were made:  Added times/dates of last doses  Changed apap to prn (takes most nights)  Changed calcium to daily  Removed vitamin D  Changed clobetasol to prn  Changed docusate to daily prn  Removed fluzone      In addition, the patient's allergies were reviewed with the patient and updated as follows:   Allergies: Codeine and Doxycycline    The pharmacist has reviewed with the patient that all personal medications should be removed from the building or locked in the belongings safe.  Patient shall only take medications ordered by the physician and administered by the nursing staff.       Medication barriers identified: none noted   Medication adherence concerns: none noted; she is concerned that she has not received her clopidogrel yet   Understanding of emergency medications: reported has not used NTG in years; would need a new bottle soon    Stephenie Camp formerly [facility], 7/12/2019,  12:27 PM

## 2019-07-12 NOTE — PHARMACY
Pharmacy- Renal Dose Adjustment    Patient Active Problem List   Diagnosis     Coronary atherosclerosis     Essential hypertension     History of vulvar dysplasia     Hyperlipidemia     Obesity     Hypertonicity of bladder     Thrombocytopenia (H)     Vitamin B 12 deficiency     JAIMEE (acute kidney injury) (H)     Cirrhosis of liver (H)        Relevant Labs:  Recent Labs   Lab Test 07/11/19  1152 05/10/18  1520   WBC 10.0 5.8   HGB 13.6 12.4   * 141*        CrCl: ~ 24 mL/min      Intake/Output Summary (Last 24 hours) at 7/11/2019 1907  Last data filed at 7/11/2019 1846  Gross per 24 hour   Intake 240 ml   Output 150 ml   Net 90 ml          Per Renal Dose Adjustment Protocol, will adjust:  Famotidine 20 mg PO once daily per CrCl < 50 mL/min      Will continue to follow and make adjustments accordingly. Thank You.    Irena Olguin [facility] ....................  7/11/2019   7:07 PM

## 2019-07-12 NOTE — PLAN OF CARE
"Up in recliner for most of the day, ambulated in crocker with SBA and cane, tolerated well, no shortness with exertion, strong steady gait. Began to feel \"queezy\" around 1530, was given ginger ale, crackers, Zofran, and these were not effective. Updated Dr. Uriarte with new orders received and noted, was given Phenergan and this was effective. Daughter's in to visit for most of shift and are supportive.     /68   Pulse 69   Temp 97.8  F (36.6  C) (Tympanic)   Resp 18   Ht 1.6 m (5' 3\")   Wt 99.1 kg (218 lb 6.4 oz)   SpO2 91%   Breastfeeding? No   BMI 38.69 kg/m      Isaura Bailon RN on 7/12/2019 at 5:58 PM    "

## 2019-07-12 NOTE — PROGRESS NOTES
HOSPITALIST DAILY PROGRESS NOTE     Name: Karly Stephenson Date: 2019 1:08 PM   MR#: [id number] : 1935   Room #: 0302/0302-01  Age/Sex: 84 year old female   Admit Date: 2019 Admitting: Moe Leon MD   Chief complaint:   Subjective:   Patient has been feeling a lot better has been able to take breakfast without any vomiting, no fever or chills no abdominal pain or diarrhea on 2019  Objective:   Physical Exam   Temp: 98.5  F (36.9  C) Temp src: Tympanic BP: 139/67 Pulse: 69 Heart Rate: 80 Resp: 18 SpO2: 91 % O2 Device: None (Room air)    Vital Signs with Ranges  Temp:  [96.9  F (36.1  C)-98.5  F (36.9  C)] 98.5  F (36.9  C)  Pulse:  [66-70] 69  Heart Rate:  [70-81] 80  Resp:  [9-22] 18  BP: (135-205)/(56-92) 139/67  SpO2:  [91 %-96 %] 91 %  218 lbs 6.4 oz    PHYSICAL EXAM:  CONSTITUTIONAL: No apparent distress, well developed.  Obese  HEENT: Non-icteric conjunctiva, EOMI, PERRL, No fresh or dry blood in nares, moist oral mucosa.  CVS: RRR.  No rubs. No gallops.   RESP: No wheezes, no rales. Good inspiratory effort, Chest wall expansion symmetrical.   GI: Soft, BS (+), NT/ND, No rebound, No guarding.   MUSCULOSKELETAL: No cyanosis, No calf tenderness, No joint swelling, No Joint erythema  INTEGUMENTARY: No new rashes, No new bruises noted.  NEURO: A/O to person, place and time. CN II-XII grossly intact.  Moves all extremities.    I have personally reviewed Labs today on 2019:  Lab Results reviewed on 2019 1:08 PM  Lab Results have reviewed as below on 2019    Most Recent 3 CBC's:  Recent Labs   Lab Test 19  0416 19  1152 05/10/18  1520   WBC 7.5 10.0 5.8   HGB 11.0* 13.6 12.4   * 104* 102*   PLT 91* 135* 141*     Most Recent 3 BMP's:  Recent Labs   Lab Test 19  0416 19  1152 05/10/18  1520    138 138   POTASSIUM 3.8 3.7 4.4   CHLORIDE 104 99 102   CO2 24 28 29   BUN 29* 29* 21   CR 1.65* 1.91* 1.04   ANIONGAP 10 11 7    ADRI 9.1 10.8* 10.3    129* 102     Most Recent 2 LFT's:  Recent Labs   Lab Test 07/12/19  0416 07/11/19  1152   * 120*   ALT 61* 76*   ALKPHOS 198* 234*   BILITOTAL 3.8* 4.3*     Most Recent 3 INR's:No lab results found.  Anticoagulation Dose History     There is no flowsheet data to display.        Most Recent 3 Troponin's:No lab results found.  Most Recent 3 BNP's:No lab results found.  Most Recent D-dimer:No lab results found.  Most Recent Cholesterol Panel:  Recent Labs   Lab Test 11/23/16  1119   LDL 50   HDL 69   TRIG 80     Most Recent 6 Bacteria Isolates From Any Culture (See EPIC Reports for Culture Details):No lab results found.  Most Recent Hemoglobin A1c:No lab results found.  Most Recent Urinalysis:  Recent Labs   Lab Test 07/11/19  1205   COLOR [place]   APPEARANCE Clear   URINEGLC Negative   URINEBILI Moderate*   URINEKETONE Trace*   SG 1.020   UBLD Negative   URINEPH 5.0   PROTEIN Trace*   UROBILINOGEN 1.0   NITRITE Positive*   LEUKEST Negative   RBCU O - 2   WBCU 0 - 5     Most Recent ABG:No lab results found.  Most Recent ESR & CRP:  Recent Labs   Lab Test 11/13/13  1547   SED 10     Most Recent CPK:No lab results found.    I have personally reviewed MEDS as below today July 12, 2019 1:08 PM    Current Facility-Administered Medications   Medication     acetaminophen (TYLENOL) Suppository 650 mg     acetaminophen (TYLENOL) tablet 650 mg     aspirin EC tablet 81 mg     famotidine (PEPCID) tablet 20 mg     metoprolol tartrate (LOPRESSOR) tablet 25 mg     naloxone (NARCAN) injection 0.1-0.4 mg     ondansetron (ZOFRAN-ODT) ODT tab 4 mg    Or     ondansetron (ZOFRAN) injection 4 mg     sodium chloride 0.9% infusion       Imaging:  Available images reviewed on July 12, 2019 1:08 PM  Assessment and Plan:          JAIMEE (acute kidney injury) secondary to dehydration/decreased p.o. intake  The patient's baseline creatinine is around 1.0, today she is improved from 1.9 to1.6.  I suspect she is  prerenal. She tells me she has not been eating or drinking well due to her symptoms of nausea and early satiety.  Continue with aggressive IV fluid replacement overnight.  Repeat labs in the morning.  Hold nephrotoxic medication.       Acute hyperbilirubinemia with obstructive jaundice along with possible cirrhosis of liver    Assessment: Abnormal liver function tests and an abnormal noncontrast CT of the abdomen and pelvis. Patient does not drink alcohol.  She does not use excessive amounts of acetaminophen.  Differential diagnosis includes abnormal LFTs from hypovolemia and a grossly normal liver versus cirrhosis or infiltrative process.  Continue IV fluid replacement as noted above.  I will fractionate her bilirubin, will check CEA and alpha-fetoprotein along with that I will also get an MRCP    Acute hypercalcemia   Mild, present on admission. Improving, Continue with IV fluids      Coronary atherosclerosis    Assessment: Chronic, stable       Essential hypertension  Stable ,monitor, holding nephrotoxic meds     DVT Prophylaxis: Pneumatic Compression Devices    Code Status: No Order      1. Quality Improvement:     Has pereira been removed? Yes   2. Reason for continued hospitalization: Renal failure and hyperbilirubinemia  3. Anticipated DC: 1 to 2 days  Gray Uriarte MD July 12, 2019 1:08 PM

## 2019-07-12 NOTE — PLAN OF CARE
"Alert and oriented, able to make needs known, uses call light appropriately. Reports pain to back that is relieved with repositioning and ice, declined offer of PRN medication to reduce pain. Adequate fluid intake, denies any nausea this AM    /68   Pulse 69   Temp 98.6  F (37  C) (Tympanic)   Resp 18   Ht 1.6 m (5' 3\")   Wt 99.1 kg (218 lb 6.4 oz)   SpO2 92%   Breastfeeding? No   BMI 38.69 kg/m      Isaura Bailon RN on 7/12/2019 at 2:11 PM        "

## 2019-07-13 NOTE — PROGRESS NOTES
Call placed to [facility] for transport to [facility] Crew being sent. Lisa Pringle RN on 7/13/2019 at 1:43 PM

## 2019-07-13 NOTE — PROGRESS NOTES
Pt brought back to room from xray and placed in recliner.   Hand off to Marielena the nurse  Allegra Ruiz on 7/13/2019 at 10:41 AM

## 2019-07-13 NOTE — PROGRESS NOTES
Patient awake through most of the night.  Was able to fall asleep around 0330.  Coughing subsided from earlier in night.  Has been up independently in room to the bathroom.  Gait steady, denies dizziness.  Left forearm has redness/swelling from IV.  New site placed in left wrist, patent and asymptomatic.  Beverly Ding RN on 7/13/2019 at 5:28 AM

## 2019-07-13 NOTE — PROGRESS NOTES
"Discharge Note      Data:  Karly Stephenson discharged to [facility] oncology unit at 1432 via stretcher. Accompanied by daughter and staff.    Action:  Report given to Shanta LOPEZ at [facility], all belongings sent with patient, daughter will follow.    /74   Pulse 69   Temp 98  F (36.7  C) (Tympanic)   Resp 16   Ht 1.6 m (5' 3\")   Wt 102.4 kg (225 lb 12.8 oz)   SpO2 93%   Breastfeeding? No   BMI 40.00 kg/m      Isaura Bailon RN on 7/13/2019 at 3:18 PM      "

## 2019-07-13 NOTE — PLAN OF CARE
"Alert and oriented, able to make needs known, uses call light appropriately. Denies any pain or discomfort, nausea or upset stomach, up ambulating to bathroom independently with strong steady gait. Bowel sounds active, lung sounds diminished in bases with fine crackles to right lower base, no tenderness with palpation to abdomen. Edema noted to bilateral LE and arms and wrists bilaterally, and appears to have gained significant weight since last weight. Will update MD on above changes from yesterday.     /65   Pulse 69   Temp 97  F (36.1  C) (Tympanic)   Resp 16   Ht 1.6 m (5' 3\")   Wt 102.4 kg (225 lb 12.8 oz)   SpO2 91%   Breastfeeding? No   BMI 40.00 kg/m      Isaura Bailon RN on 7/13/2019 at 8:42 AM    "

## 2019-07-13 NOTE — PROGRESS NOTES
Patient pleasant and answers questions appropriately.  Denies nausea.  Has aching in right lower back and abdomen, ice pack applied.  Denies any difficulty or pain with urination.  Family at bedside visiting. Beverly Ding RN on 7/13/2019 at 5:24 AM

## 2019-07-13 NOTE — PLAN OF CARE
Patient is alert, oriented and pleasant.  Denies nausea tonight- had some tea and crackers.  Complained of pain in right lower back and right side abdomen. Abdomen soft- denied increased tenderness with touch, bowel sounds active.  Received tylenol and had ice pack to back to manage discomfort.  Patient has not slept much tonight due to worry about her health.  Patient reported that her face 'felt tingly from stress and sleeplessness'- that she has experienced the sensation before.  Patient denies changes in vision, headache, dizziness, pupils equal/reactive, hand grasps/extremity movement strong and vitals stable.  Sensation subsided upon re-evaluation half hour later and patient stated that was similar to previous episodes.  Patient has had dry cough- nonproductive and had cough drops.  Lungs clear.  Beverly Ding RN on 7/13/2019 at 2:33 AM

## 2019-07-13 NOTE — PLAN OF CARE
"Denies any pain or discomfort, fair appetite this day, up to bathroom independently with strong steady gait, denies any nausea or abdominal pain, sitting up in chair visiting with daughters most of late morning early afternoon.     /74   Pulse 69   Temp 98  F (36.7  C) (Tympanic)   Resp 16   Ht 1.6 m (5' 3\")   Wt 102.4 kg (225 lb 12.8 oz)   SpO2 93%   Breastfeeding? No   BMI 40.00 kg/m      Isaura Bailon RN on 7/13/2019 at 1:15 PM    "

## 2019-07-13 NOTE — DISCHARGE SUMMARY
Discharge Summary     Name: Karly Stephenson Date: 2019 12:40 PM   MR#: [id number] : 1935   Room #: 0302/0302-01  Age/Sex: 84 year old female   Admit Date: 2019 Admitting: Moe Leon MD   Acct #:        Discharge Date: 2019    Physician: Gray Uriarte MD     Discharge Diagnoses/Hospital Course::   Karly Stephenson is a 84 year old female who was admitted with       JAIMEE (acute kidney injury) secondary to dehydration/decreased p.o. intake  The patient's baseline creatinine is around 1.0, today she is improved from 1.9 to1.6 to 1.2.  Stop IV fluid.   she has not been eating or drinking well due to her symptoms of nausea and early satiety.  Hold nephrotoxic medication.       Acute hyperbilirubinemia with obstructive jaundice along with liver malignancy/metastasis    Assessment: Abnormal liver function tests and an abnormal noncontrast CT of the abdomen and pelvis. Patient does not drink alcohol.  She does not use excessive amounts of acetaminophen.  Differential diagnosis includes  infiltrative process.   Patient CT scan and MRCP was reviewed showed possibly liver metastasis.  He is negative     Acute hypercalcemia   Mild, present on admission. Improved       Coronary atherosclerosis    Assessment: Chronic, stable       Essential hypertension  Stable ,monitor, holding nephrotoxic meds    Patient does not show any signs of biliary stone at this point she does not have any clinical symptoms her Benavides sign is negative.  Her hyperbilirubinemia along with positive MRI for possible liver metastasis needs to be addressed by either gastroenterologist or hepatologist and I guess she needs a liver biopsy at this point.  I spoke to Dr. Rowell in [facility] and patient will be accepted by them to be seen by hepatologist and interventional radiology for her liver biopsy.  Patient had a colonoscopy more than a year ago at that time was negative for any malignancy.  Patient last CMP was in  May 2018 which was completely normal with a normal bilirubin AST ALT and alk phos. A detailed signout was given to the accepting hospitalist on the phone.    Procedures/Imaging:   X-ray Chest 2 vws*   Final Result   IMPRESSION: Right hilar fullness consistent with known right hilar   mass. Mild linear changes which are probably atelectasis.      SANDRA VILLAGOMEZ MD      MR Abdomen MRCP w/o Contast & w Recon   Final Result   IMPRESSION:  Miliary lesions throughout the liver, suspicious for   metastatic disease. No evidence of biliary ductal dilatation or   biliary filling defect. Recommend hepatic tissue sampling.      ZANA HAYWOOD MD      CT Abdomen wo & w & Pelvis w Contrast   Final Result   IMPRESSION:  Findings are highly suspicious for malignancy, most   likely lung primary, with metastatic disease to the liver and   mediastinal and upper abdominal lymph nodes.      FARIDEH WISDOM MD      CT Chest w Contrast   Final Result   IMPRESSION:  Findings are highly suspicious for malignancy, most   likely lung primary, with metastatic disease to the liver and   mediastinal and upper abdominal lymph nodes.      FARIDEH WISDOM MD      CT Abdomen Pelvis w/o Contrast   Final Result   Addendum 1 of 1   ADDENDUM: CORRECTED TYPO IN IMPRESSION ; NO OTHER CHANGES TO REPORT   (7/12/2019 3:26 PM ANB)      PROCEDURE:  CT ABDOMEN PELVIS W/O CONTRAST      HISTORY:  Abd pain, unspecified; Nausea, vomiting; Hematuria, unknown   cause; oral contrast only       TECHNIQUE:  Helical CT of the abdomen and pelvis was performed without   intravenous contrast. Sagittal and coronal reformatted images were   reviewed.      COMPARISON:  CT abdomen and pelvis 11/18/2013      FINDINGS:        There is atelectasis in both lung bases. Coronary artery   calcifications are noted.      The liver is diffusely heterogeneous in attenuation with a nodular   contour and the suggestion of multiple small nodules and/or masses.   This is incompletely  assessed without IV contrast but does appear new   from the prior study. The noncontrast appearance of the pancreas,   adrenal glands, and spleen is unremarkable. The gallbladder is   present.      There are multiple bilateral renal cysts, including a large right   renal cyst measuring 10 cm in diameter. There is no hydronephrosis.       The bowel is normal in caliber. The appendix is unremarkable.       The aorta is normal in size with scattered atherosclerotic   calcifications.      No free fluid, free air or adenopathy is present.  Partially calcified   fibroid is again noted in the uterus, unchanged.      No suspicious osseous lesions are identified.      IMPRESSION:     1. Abnormal appearance of the LIVER raises concern for malignancy   versus cirrhosis. This could be better assessed with IV contrast if   the patient's renal function improves.   2. Bilateral renal cysts. No hydronephrosis or renal calculi.      FARIDEH WISDOM MD      Final           Discharge Condition: Condition at discharge: Stable    Physical Exam   Temp: 97  F (36.1  C) Temp src: Tympanic BP: 140/65   Heart Rate: 65 Resp: 16 SpO2: 91 % O2 Device: None (Room air)    Vital Signs with Ranges  Temp:  [97  F (36.1  C)-98.8  F (37.1  C)] 97  F (36.1  C)  Heart Rate:  [65-81] 65  Resp:  [16-18] 16  BP: (128-148)/(64-75) 140/65  SpO2:  [90 %-95 %] 91 %  225 lbs 12.8 oz    General: NAD, not using accessory muscles of respiration   CVS: RRR  RESP:  no wheezes, no rales  GI: BS+. Abd soft  Musculoskeletal/Ext: no cyanosis  Neuro: Grossly non-focal. Moves all extremities.       Disposition: She will be transferred today to [facility].    Discharge Medications:   Karly Stephenson   Home Medication Instructions MICAH:[id number]    Printed on:07/13/19 1240   Medication Information                      aspirin EC 81 MG EC tablet  Take 81 mg by mouth daily with food             calcium citrate-vitamin D (CALCIUM CITRATE + D) 315-250 MG-UNIT TABS per  tablet  Take 2 tablets by mouth daily              clobetasol (TEMOVATE) 0.05 % external ointment  Apply topically 2 times daily as needed             clopidogrel (PLAVIX) 75 MG tablet  Take 1 tablet (75 mg) by mouth daily             cyanocobalamin (RA VITAMIN B-12 TR) 1000 MCG TBCR  Take 1,000 mcg by mouth daily             docusate sodium (COLACE) 100 MG capsule  Take 100 mg by mouth daily as needed for constipation              dorzolamide-timolol (COSOPT) 2-0.5 % ophthalmic solution  Place 1 drop into both eyes 2 times daily             famotidine (PEPCID) 20 MG tablet  Take 1 tablet (20 mg) by mouth 2 times daily             latanoprost (XALATAN) 0.005 % ophthalmic solution  Place 1 drop into both eyes every evening             loratadine (CLARITIN) 10 MG tablet  Take 1 tablet (10 mg) by mouth daily             losartan (COZAAR) 50 MG tablet  Take 1 tablet (50 mg) by mouth daily             metoprolol tartrate (LOPRESSOR) 25 MG tablet  Take 1 tablet (25 mg) by mouth 2 times daily             nitroGLYcerin (NITROSTAT) 0.4 MG sublingual tablet  Place 1 tablet (0.4 mg) under the tongue every 5 minutes as needed for chest pain             simvastatin (ZOCOR) 20 MG tablet  Take 1 tablet (20 mg) by mouth At Bedtime                  Discharge Instructions:  Reviewed with the patient.     Time spent on discharge: Including coordinating care with patient, nursing staff, transfer care manager, discharge instructions and follow up information: 32 Minutes  I have emphasized to patient to seek immediate medical attention with any alarming symptoms.      A copy of this discharge summary was routed the patient's PCP, Sydney Carbajal MD July 13, 2019 12:40 PM

## 2019-07-15 PROBLEM — I50.9 CHF (CONGESTIVE HEART FAILURE) (H): Status: ACTIVE | Noted: 2019-01-01

## 2019-07-16 PROBLEM — R16.0 LIVER MASS: Status: ACTIVE | Noted: 2019-01-01

## 2019-07-16 NOTE — PHARMACY
Pharmacy- Renal Dose Adjustment    Patient Active Problem List   Diagnosis     Coronary atherosclerosis     Essential hypertension     History of vulvar dysplasia     Hyperlipidemia     Obesity     Hypertonicity of bladder     Thrombocytopenia (H)     Vitamin B 12 deficiency     JAIMEE (acute kidney injury) (H)     Cirrhosis of liver (H)     CHF (congestive heart failure) (H)     Liver mass        Relevant Labs:  Recent Labs   Lab Test 07/16/19  0424 07/15/19  2012   WBC 9.0 8.9   HGB 12.4 12.2   * 127*        CrCl: 46      Intake/Output Summary (Last 24 hours) at 7/16/2019 1128  Last data filed at 7/16/2019 0900  Gross per 24 hour   Intake 200 ml   Output 1900 ml   Net -1700 ml          Per Renal Dose Adjustment Protocol, will adjust:  Famotidine 20mg to once daily      Will continue to follow and make adjustments accordingly. Thank You.    Ceci Rebolledo [facility] ....................  7/16/2019   11:28 AM

## 2019-07-16 NOTE — PHARMACY-ADMISSION MEDICATION HISTORY
Pharmacy -- Admission Medication Reconciliation    Prior to admission (PTA) medications were reviewed and the patient's PTA medication list was updated.    Sources Consulted: patient interview, sure scripts, recent discharge note 7/13 [facility], discharge rx orders from [facility]    The reliability of this Medication Reconciliation is: Reliability: Reliable    The following significant changes were made:    Added APAP     Added tramadol frequency--new rx 7/14, patient has not started this yet    **Losartan, simvastatin, asa, and plavix on hold per [facility] due to upcoming biopsy.  Last asa/plavix dose here 7/13 per note, patient unsure of lostartan/simvastatin-thinks it was at home 7/11    In addition, the patient's allergies were reviewed with the patient and updated as follows:   Allergies: Codeine and Doxycycline    The pharmacist has reviewed with the patient that all personal medications should be removed from the building or locked in the belongings safe.  Patient shall only take medications ordered by the physician and administered by the nursing staff.       Medication barriers identified: none   Medication adherence concerns: none   Understanding of emergency medications: DANG Llamas RPH, 7/16/2019,  12:10 PM

## 2019-07-16 NOTE — PLAN OF CARE
Pt /73, afebrile, complaints of all over aches and pains - relieved by tylenol.  Pt was given 40 mg oral lasix in the ER and 20 mg IV on the floor after admission.  Pt has up to the bathroom a lot with SBA and urinating without issue.  Pt stable on feet and using call light appropriately.  Alarm on bed for now. Pt states the shortness of breath seems to be getting a little bit better. Currently resting in bed with with head and feet elevated with TV on.  Will continue to monitor.  Alana Willett RN............................ 7/16/2019 4:56 AM

## 2019-07-16 NOTE — PROGRESS NOTES
Spoke to MD Dr. Leon at 1205 regarding patient's concern with taking her oral Cozaar this am, as she states she was told in previous discharge to stop taking the Cozaar and other meds in preparation for a liver biopsy that has yet to be scheduled per the patient. MD aware and states he would like the patient to take the medication as ordered and she should be fine for her biopsy, she just should not be taking the Plavix (currently not ordered or given).

## 2019-07-16 NOTE — ED TRIAGE NOTES
Patient reporting she has been increasingly more weak, nauseous and SOB throughout the last couple days. Patient was recently discharged from [facility], [place] for CT, and MRI tests for potential Biopsys on liver. Patient brought in by ambulance. Patient A/O, VSS, and placed on cardiac monitor. Patient had low grade fever of 99.2 temp in route.

## 2019-07-16 NOTE — H&P
[facility]    History and Physical  Hospitalist       Date of Admission:  7/15/2019    Assessment & Plan   Karly Stephenson is a 84 year old female who presents with dyspnea on exertion and dyspnea.     Principal Problem:    CHF (congestive heart failure) (H)    Assessment: suspect volume overload from recent fluid resuscitation.     Plan: Diurese with lasix, echo today.      Liver mass    Assessment: suspicious metastatic disease     Plan: patient will need live biopsy in near future. Needs to delay until Friday due to Plavix.    Active Problems:    Coronary atherosclerosis    Assessment: chronic       Essential hypertension    Assessment: chronic        DVT Prophylaxis: Pneumatic Compression Devices  Code Status: Full Code    Moe Leon    Primary Care Physician   Sydney Viera    Chief Complaint   Dyspnea     History is obtained from the patient and chart review.    History of Present Illness   Karly Stephenson is a 84 year old female who presents with dyspnea on exertion and dyspnea.  The patient was hospitalized at [facility] a week ago with acute kidney injury and found to have what appears to be invasive metastatic liver disease.  She was transferred to [place] for a oncology consultation and biopsy.  These were not done on an inpatient basis and will be scheduled as an outpatient basis in the near future.  She presents to the emergency department unable to walk from a bed to the bathroom without stopping and resting.  She has a productive cough of clear sputum.  She notes lower extremity edema.  She denies any chest pain.  No fevers or chills.  No increased abdominal girth.    In the emergency department a chest x-ray shows pulmonary edema and she has an elevated B natruretic peptide.  She was admitted for acute heart failure.  The patient has a history of coronary artery disease.  An echocardiogram done in 2015 shows normal cardiac function.    Past Medical History    I have  reviewed this patient's medical history and updated it with pertinent information if needed.   Past Medical History:   Diagnosis Date     Allergic rhinitis      ASCVD (arteriosclerotic cardiovascular disease) 05/05/2010    STEMI  received TNK, Cor angio with Markedly ectatic proximal to mid dominant LCX with distal likely embolic OM3 culprit     Carcinoma in situ of vulva 2000     Initial CIS, subsequent ALEXANDER III, local excisions     Colon polyp 10/11/2005     Dermatitis, atopic 2/23/2018     Essential hypertension 2/23/2018     Glaucoma 2005     Hyperlipidemia      Hypertonic bladder      Low back pain      Obesity      Osteoarthritis      Phlebitis and thrombophlebitis     2007, 2012,legs     Thrombocytopenia (H)      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     Vitamin B 12 deficiency 3/5/2014       Past Surgical History   I have reviewed this patient's surgical history and updated it with pertinent information if needed.  Past Surgical History:   Procedure Laterality Date     ANGIOGRAM  05/2010    coronary angio at [facility]     ARTHROPLASTY HIP  1999    done after fracture     ARTHROPLASTY KNEE Bilateral 2000     ARTHROSCOPY SHOULDER ROTATOR CUFF REPAIR Left 2002     COLONOSCOPY  10/2005    tubular adenoma     EYE SURGERY  04/2008    laser surgery glaucoma; again in 2012     TONSILLECTOMY, ADENOIDECTOMY, COMBINED  1999     VULVECTOMY SIMPLE  2000    CIS     VULVECTOMY SIMPLE  06/2005    ALEXANDER III       Prior to Admission Medications   Prior to Admission Medications   Prescriptions Last Dose Informant Patient Reported? Taking?   aspirin EC 81 MG EC tablet 7/15/2019 at Unknown time  Yes Yes   Sig: Take 81 mg by mouth daily with food   calcium citrate-vitamin D (CALCIUM CITRATE + D) 315-250 MG-UNIT TABS per tablet 7/15/2019 at Unknown time  Yes Yes   Sig: Take 2 tablets by mouth daily    clobetasol (TEMOVATE) 0.05 % external ointment Unknown at Unknown time  Yes No   Sig: Apply  topically 2 times daily as needed   clopidogrel (PLAVIX) 75 MG tablet Past Week at Unknown time  No Yes   Sig: Take 1 tablet (75 mg) by mouth daily   cyanocobalamin (RA VITAMIN B-12 TR) 1000 MCG TBCR 7/15/2019 at Unknown time  Yes Yes   Sig: Take 1,000 mcg by mouth daily   docusate sodium (COLACE) 100 MG capsule 7/15/2019 at Unknown time  Yes Yes   Sig: Take 100 mg by mouth daily as needed for constipation    dorzolamide-timolol (COSOPT) 2-0.5 % ophthalmic solution Unknown at Unknown time  Yes No   Sig: Place 1 drop into both eyes 2 times daily   famotidine (PEPCID) 20 MG tablet 7/15/2019 at Unknown time  No Yes   Sig: Take 1 tablet (20 mg) by mouth 2 times daily   latanoprost (XALATAN) 0.005 % ophthalmic solution Unknown at Unknown time  Yes No   Sig: Place 1 drop into both eyes every evening   loratadine (CLARITIN) 10 MG tablet   No No   Sig: Take 1 tablet (10 mg) by mouth daily   losartan (COZAAR) 50 MG tablet 7/15/2019 at Unknown time  No Yes   Sig: Take 1 tablet (50 mg) by mouth daily   metoprolol tartrate (LOPRESSOR) 25 MG tablet 7/15/2019 at Unknown time  No Yes   Sig: Take 1 tablet (25 mg) by mouth 2 times daily   nitroGLYcerin (NITROSTAT) 0.4 MG sublingual tablet Unknown at Unknown time  No No   Sig: Place 1 tablet (0.4 mg) under the tongue every 5 minutes as needed for chest pain   simvastatin (ZOCOR) 20 MG tablet 7/14/2019 at Unknown time  No Yes   Sig: Take 1 tablet (20 mg) by mouth At Bedtime   traMADol (ULTRAM) 50 MG tablet Unknown at Unknown time  Yes No   Sig: Take 50 mg by mouth      Facility-Administered Medications: None     Allergies   Allergies   Allergen Reactions     Codeine Nausea     Doxycycline Other (See Comments)     jittery       Social History   I have reviewed this patient's social history and updated it with pertinent information if needed. Karly ROSA Stefaniasenait  reports that she quit smoking about 37 years ago. Her smoking use included cigarettes. She has a 15.00 pack-year smoking  history. She has never used smokeless tobacco. She reports that she does not drink alcohol or use drugs.    Family History   I have reviewed this patient's family history and updated it with pertinent information if needed.   Family History   Problem Relation Age of Onset     Hypertension Father         Hypertension,h/o CVA.  in his 70s     Cancer Mother         Cancer, age 92, lung cancer     Breast Cancer Sister      Ulcerative Colitis Sister      Diabetes Sister      No Known Problems Brother        Review of Systems     REVIEW OF SYSTEMS:    Constitutional: normal energy and appetite, no recent sick contacts  Eyes: no changes in vision  Ears, nose, mouth, throat, and face: no mouth sores, dysphagia, or odynophagia  Respiratory: dyspnea on exertion and rest. cough  Cardiovascular: no chest pain, palpitations, orthopnea, increased lower extremity edema, or syncope.   Gastrointestinal: no constipation, diarrhea, nausea, vomiting or abdominal pain.  Genitourinary: no dysuria, hematuria, urgency or frequency.   Hematologic/lymphatic: no unintentional weight loss or night sweats.  Musculoskeletal: no pain to extremities or falls.   Neurological: no new weakness, tingling, numbness.   Psychiatric: no hallucinations or delusions.  Endocrine:  not a known diabetic.    Physical Exam   Temp: 98.3  F (36.8  C) Temp src: Tympanic BP: 168/75 Pulse: 74 Heart Rate: 75 Resp: 16 SpO2: 96 % O2 Device: None (Room air)    Vital Signs with Ranges  Temp:  [98.2  F (36.8  C)-99.3  F (37.4  C)] 98.3  F (36.8  C)  Pulse:  [73-80] 74  Heart Rate:  [74-80] 75  Resp:  [10-58] 16  BP: (147-195)/() 168/75  SpO2:  [91 %-97 %] 96 %  222 lbs 0 oz    Constitutional: In no apparent distress  Eyes: pupils reactive, extraocular movements intact. Anicteric sclera.   HEENT: TM's normal, oropharynx nonerythematous. Neck supple, no JVD.  Respiratory: no crackles noted, no wheezes  Cardiovascular: regular, no murmur. No lower  extremity edema.  GI: soft, non-tender, bowel sounds present.  Lymph/Hematologic: no cervical or supraclavicular LAD.  Genitourinary: deferred  Skin: no rashes, or sores  Musculoskeletal: no joint erythema or swelling  Neurologic: cranial nerves symmetric. Neuro exam nonfocal  Psychiatric: alert and oriented x3. Interactive.       Data   Data reviewed today:  I personally reviewed the chest x-ray image(s) showing pulmonary edema.  Recent Labs   Lab 07/16/19  0424 07/15/19  2012 07/13/19  0515  07/11/19  1152   WBC 9.0 8.9 7.3   < > 10.0   HGB 12.4 12.2 11.4*   < > 13.6   * 100 106*   < > 104*   * 127* 96*   < > 135*    135 136   < > 138   POTASSIUM 3.6 3.8 3.7   < > 3.7   CHLORIDE 99 101 104   < > 99   CO2 27 24 23   < > 28   BUN 15 15 24   < > 29*   CR 1.06 0.95 1.24*   < > 1.91*   ANIONGAP 11 10 9   < > 11   ADRI 9.1 8.9 8.6   < > 10.8*   GLC 99 104 88   < > 129*   ALBUMIN  --  3.1* 3.0*   < > 3.7   PROTTOTAL  --  6.1* 5.9*   < > 7.1   BILITOTAL  --  5.2* 4.0*   < > 4.3*   ALKPHOS  --  254* 226*   < > 234*   ALT  --  68* 65*   < > 76*   AST  --  132* 112*   < > 120*   LIPASE  --   --   --   --  19    < > = values in this interval not displayed.       Recent Results (from the past 24 hour(s))   XR Chest 2 Views    Narrative    PROCEDURE:  XR CHEST 2 VW    HISTORY: SOB; SOB (shortness of breath), .    COMPARISON:  7/13/2019    FINDINGS:  The cardiomediastinal contours are unchanged, including a right hilar  mass better seen on prior CT.  The trachea is midline.  There is progressive cephalization of vasculature and new trace  pleural effusions. Multiple Kerley B-lines are suggested.    No suspicious osseous lesion or subdiaphragmatic free air.      Impression    IMPRESSION:      Findings suggest worsening CHF when compared to 7/13/2019.      ZANA HAYWOOD MD

## 2019-07-16 NOTE — ED PROVIDER NOTES
History     Chief Complaint   Patient presents with     Shortness of Breath     Generalized Weakness     Nausea     HPI  Karly Stephenson is a 84 year old female who is here with weakness and shortness of breath.  Patient was hospitalized  here last week and transferred to [place] due to metastases and oncology consultation.  She was discharged home yesterday and there are plans to do a liver biopsy as outpatient in the near future.  She is here by ambulance tonight because she is extremely weak and short of breath and really cannot get around at home on her own.  No new chest pain.  No fevers or chills.  No appetite.  Is drinking some liquids.  No change in bowel or bladder.    Allergies:  Allergies   Allergen Reactions     Codeine Nausea     Doxycycline Other (See Comments)     jittery       Problem List:    Patient Active Problem List    Diagnosis Date Noted     CHF (congestive heart failure) (H) 07/15/2019     Priority: Medium     JAIMEE (acute kidney injury) (H) 07/11/2019     Priority: Medium     Cirrhosis of liver (H) 07/11/2019     Priority: Medium     Essential hypertension 02/23/2018     Priority: Medium     Hyperlipidemia 02/23/2018     Priority: Medium     Obesity 02/23/2018     Priority: Medium     Hypertonicity of bladder 02/23/2018     Priority: Medium     Vitamin B 12 deficiency 03/05/2014     Priority: Medium     Thrombocytopenia (H) 10/14/2013     Priority: Medium     Coronary atherosclerosis 05/05/2010     Priority: Medium     Overview:   STEMI 05/05/2010; received TNK, Cor angio with Markedly ectatic proximal to mid dominant LCX with distal likely embolic OM3 culprit       History of vulvar dysplasia 01/01/2000     Priority: Medium     Overview:   Initial CIS, subsequent ALEXANDER III, local excisions          Past Medical History:    Past Medical History:   Diagnosis Date     Allergic rhinitis      ASCVD (arteriosclerotic cardiovascular disease) 05/05/2010     Carcinoma in situ of vulva 2000     Colon  polyp 10/11/2005     Dermatitis, atopic 2018     Essential hypertension 2018     Glaucoma 2005     Hyperlipidemia      Hypertonic bladder      Low back pain      Obesity      Osteoarthritis      Phlebitis and thrombophlebitis      Thrombocytopenia (H)      Varicose veins of lower extremities with inflammation 2018     Vitamin B 12 deficiency 3/5/2014       Past Surgical History:    Past Surgical History:   Procedure Laterality Date     ANGIOGRAM  2010    coronary angio at [facility]     ARTHROPLASTY HIP      done after fracture     ARTHROPLASTY KNEE Bilateral      ARTHROSCOPY SHOULDER ROTATOR CUFF REPAIR Left      COLONOSCOPY  10/2005    tubular adenoma     EYE SURGERY  2008    laser surgery glaucoma; again in      TONSILLECTOMY, ADENOIDECTOMY, COMBINED       VULVECTOMY SIMPLE      CIS     VULVECTOMY SIMPLE  2005    ALEXANDER III       Family History:    Family History   Problem Relation Age of Onset     Hypertension Father         Hypertension,h/o CVA.  in his 70s     Cancer Mother         Cancer, age 92, lung cancer     Breast Cancer Sister      Ulcerative Colitis Sister      Diabetes Sister      No Known Problems Brother        Social History:  Marital Status:   [2]  Social History     Tobacco Use     Smoking status: Former Smoker     Packs/day: 0.50     Years: 30.00     Pack years: 15.00     Types: Cigarettes     Last attempt to quit: 1982     Years since quittin.5     Smokeless tobacco: Never Used   Substance Use Topics     Alcohol use: No     Alcohol/week: 0.0 oz     Drug use: No     Comment: Drug use: No        Medications:      No current outpatient medications on file.      Review of Systems   Constitutional: Negative for chills and fever.   HENT: Negative for congestion.    Eyes: Negative for visual disturbance.   Respiratory: Positive for cough and shortness of breath. Negative for chest tightness.    Cardiovascular:  "Negative for chest pain.   Gastrointestinal: Negative for nausea and vomiting.   Genitourinary: Negative for dysuria.   Musculoskeletal: Negative for arthralgias.   Skin: Negative for rash.   Neurological: Positive for weakness. Negative for light-headedness.   Psychiatric/Behavioral: Negative for agitation.       Physical Exam   BP: 194/88  Pulse: 78  Heart Rate: 76  Temp: 98.2  F (36.8  C)  Resp: 20  Height: 162.6 cm (5' 4\")(pt stated)  Weight: 102.1 kg (225 lb)  SpO2: 91 %  Sitting Orthostatic BP: 189/107  Sitting Orthostatic Pulse: 79 bpm  Standing Orthostatic BP: 175/89  Standing Orthostatic Pulse: 77 bpm      Physical Exam   Constitutional: She is oriented to person, place, and time. She appears well-developed and well-nourished.   HENT:   Head: Normocephalic and atraumatic.   Mouth/Throat: Oropharynx is clear and moist.   Neck: Neck supple.   Cardiovascular: Normal rate, regular rhythm and normal heart sounds.   Pulmonary/Chest: No accessory muscle usage. No respiratory distress. She has rales in the right lower field and the left lower field.   Abdominal: Soft.   Musculoskeletal:        Right lower leg: She exhibits edema.        Left lower leg: She exhibits edema.   Neurological: She is alert and oriented to person, place, and time.   Skin: Skin is warm and dry.   Nursing note and vitals reviewed.      ED Course        Procedures               EKG Interpretation:      Interpreted by Charli Whitaker   Symptoms at time of EKG: sob   Rhythm: normal sinus   Rate: normal  Axis: normal  Ectopy: none  Conduction: normal  ST Segments/ T Waves: Non specific ST-T wave changes  Q Waves: none                         Results for orders placed or performed during the hospital encounter of 07/15/19 (from the past 24 hour(s))   CBC with platelets differential   Result Value Ref Range    WBC 8.9 4.0 - 11.0 10e9/L    RBC Count 3.67 (L) 3.8 - 5.2 10e12/L    Hemoglobin 12.2 11.7 - 15.7 g/dL    Hematocrit 36.8 35.0 - 47.0 %    MCV " 100 78 - 100 fl    MCH 33.2 (H) 26.5 - 33.0 pg    MCHC 33.2 31.5 - 36.5 g/dL    RDW 15.4 (H) 10.0 - 15.0 %    Platelet Count 127 (L) 150 - 450 10e9/L    Diff Method Automated Method     % Neutrophils 68.5 %    % Lymphocytes 14.0 %    % Monocytes 9.7 %    % Eosinophils 3.0 %    % Basophils 0.7 %    % Immature Granulocytes 4.1 %    Absolute Neutrophil 6.1 1.6 - 8.3 10e9/L    Absolute Lymphocytes 1.2 0.8 - 5.3 10e9/L    Absolute Monocytes 0.9 0.0 - 1.3 10e9/L    Absolute Eosinophils 0.3 0.0 - 0.7 10e9/L    Absolute Basophils 0.1 0.0 - 0.2 10e9/L    Abs Immature Granulocytes 0.4 0 - 0.4 10e9/L   Comprehensive metabolic panel   Result Value Ref Range    Sodium 135 134 - 144 mmol/L    Potassium 3.8 3.5 - 5.1 mmol/L    Chloride 101 98 - 107 mmol/L    Carbon Dioxide 24 21 - 31 mmol/L    Anion Gap 10 3 - 14 mmol/L    Glucose 104 70 - 105 mg/dL    Urea Nitrogen 15 7 - 25 mg/dL    Creatinine 0.95 0.60 - 1.20 mg/dL    GFR Estimate 56 (L) >60 mL/min/[1.73_m2]    GFR Estimate If Black 68 >60 mL/min/[1.73_m2]    Calcium 8.9 8.6 - 10.3 mg/dL    Bilirubin Total 5.2 (H) 0.3 - 1.0 mg/dL    Albumin 3.1 (L) 3.5 - 5.7 g/dL    Protein Total 6.1 (L) 6.4 - 8.9 g/dL    Alkaline Phosphatase 254 (H) 34 - 104 U/L    ALT 68 (H) 7 - 52 U/L     (H) 13 - 39 U/L   Nt probnp inpatient (BNP)   Result Value Ref Range    N-Terminal Pro BNP Inpatient 678 (H) 0 - 100 pg/mL   XR Chest 2 Views    Narrative    PROCEDURE:  XR CHEST 2 VW    HISTORY: SOB; SOB (shortness of breath), .    COMPARISON:  7/13/2019    FINDINGS:  The cardiomediastinal contours are unchanged, including a right hilar  mass better seen on prior CT.  The trachea is midline.  There is progressive cephalization of vasculature and new trace  pleural effusions. Multiple Kerley B-lines are suggested.    No suspicious osseous lesion or subdiaphragmatic free air.      Impression    IMPRESSION:      Findings suggest worsening CHF when compared to 7/13/2019.      ZANA HAYWOOD MD    *UA reflex to Microscopic   Result Value Ref Range    Color Urine Yellow     Appearance Urine Clear     Glucose Urine 100 (A) NEG^Negative mg/dL    Bilirubin Urine Large (A) NEG^Negative    Ketones Urine Trace (A) NEG^Negative mg/dL    Specific Gravity Urine 1.020 1.000 - 1.030    Blood Urine Negative NEG^Negative    pH Urine 5.0 5.0 - 9.0 pH    Protein Albumin Urine Trace (A) NEG^Negative mg/dL    Urobilinogen Urine 2.0 (H) 0.2 - 1.0 EU/dL    Nitrite Urine Negative NEG^Negative    Leukocyte Esterase Urine Negative NEG^Negative    Source Midstream Urine    Urine Microscopic   Result Value Ref Range    WBC Urine 0 - 5 OTO5^0 - 5 /HPF    RBC Urine O - 2 OTO2^O - 2 /HPF    Bacteria Urine Few (A) NEG^Negative /HPF       Medications   ondansetron (ZOFRAN-ODT) ODT tab 4 mg (has no administration in time range)     Or   ondansetron (ZOFRAN) injection 4 mg (has no administration in time range)   acetaminophen (TYLENOL) tablet 650 mg (650 mg Oral Given 7/16/19 0111)   furosemide (LASIX) tablet 40 mg (40 mg Oral Given 7/15/19 2144)   furosemide (LASIX) injection 20 mg (20 mg Intravenous Given 7/16/19 0112)       Assessments & Plan (with Medical Decision Making)     I have reviewed the nursing notes.    I have reviewed the findings, diagnosis, plan and need for follow up with the patient.    Patient is a very weak and extremely dyspneic with any exertion.  We got her up to sit in the commode at the bedside and she could barely do this and needed significant assistance.  After which she had to sit on the edge of the bed for about 5 to 10 minutes before she  could catch her breath.  She was however able to maintain her oxygen saturations.  Based on above studies with elevated d-dimer, chest x-ray showing increased pulmonary vascular congestion, and her exam with increased pedal edema, I believe she is having an exacerbation of congestive heart failure.  She was recently in the hospital and was dehydrated and was given a  significant amount of IV fluids and perhaps she  has gone too far the other way.  I do not feel she can go home at this time.  I spoke with Dr. Leon and the patient will be admitted to the medical floor for diuresis and observation.       Medication List      There are no discharge medications for this visit.         Final diagnoses:   Acute congestive heart failure, unspecified heart failure type (H)       7/15/2019   [facility]     Charli Whitaker MD  07/16/19 0226

## 2019-07-16 NOTE — PLAN OF CARE
Patient up to bathroom with stand by assist of one staff and voiding throughout the day. Patient up in chair and visiting with family. Patient had a slight indigestion late morning and provided Tums for relief. Patient did state some light upset stomach this afternoon and was offered and provided Sprite for assistance and relief. Alert and orientated.

## 2019-07-16 NOTE — PROGRESS NOTES
[facility] ADMISSION NOTE    Patient admitted to room 312 at approximately 0017 via wheel chair from emergency room. Patient was accompanied by daughter.     Verbal SBAR report received from JESSICA Delarosa prior to patient arrival.     Patient ambulated to bed independently. Patient alert and oriented X 4. Pain is controlled without any medications. 0-10 Pain Scale: 0. Admission vital signs: Blood pressure 177/73, pulse 74, temperature 99.3  F (37.4  C), temperature source Tympanic, resp. rate 24, weight 102.1 kg (225 lb), SpO2 93 %, not currently breastfeeding. Patient was oriented to plan of care, call light, bed controls, tv, telephone, bathroom and visiting hours.     Risk Assessment    The following safety risks were identified during admission: fall. Yellow risk band applied: YES.     Skin Initial Assessment    This writer admitted this patient and completed a full skin assessment and Wander score in the Adult PCS flowsheet. Appropriate interventions initiated as needed.       Wander Risk Assessment  Sensory Perception: 4-->no impairment  Moisture: 4-->rarely moist  Activity: 3-->walks occasionally  Mobility: 3-->slightly limited  Nutrition: 3-->adequate  Friction and Shear: 3-->no apparent problem  Wander Score: 20    Alana Willett

## 2019-07-16 NOTE — PROGRESS NOTES
Chart accessed in anticipication of transfer to [facility].  Alana Willett RN............................ 7/15/2019 11:44 PM      Report received from JESSICA Delarosa prior to transfer.  Alana Willett RN............................ 7/16/2019 12:00 AM

## 2019-07-17 NOTE — PROGRESS NOTES
[facility]    Hospitalist Progress Note      Assessment & Plan   Karly Stephenson is a 84 year old female who was admitted on 7/15/2019.       CHF (congestive heart failure) (H)    Assessment: suspect volume overload from recent fluid resuscitation. Echo shows good heart function, mild pulmonary hypertension. Weight down 2+ lbs and net negative 2 liters diuresis    Plan: Monitor, presume discharge tomorrow.       Liver mass    Assessment: suspicious metastatic disease     Plan: patient will have liver biopsy on Friday.     Active Problems:    Coronary atherosclerosis    Assessment: chronic       Essential hypertension    Assessment: chronic    DVT Prophylaxis: Pneumatic Compression Devices  Code Status: Full Code    Moe eLon    Interval History   Less dyspnea. No pain. No fevers, chills.     -Data reviewed today: I reviewed all new labs and imaging results over the last 24 hours. I personally reviewed the chest x-ray image(s) showing mild pulm edema.    Physical Exam   Temp: 97.7  F (36.5  C) Temp src: Tympanic BP: 121/60   Heart Rate: 65 Resp: 18 SpO2: 93 % O2 Device: None (Room air)    Vitals:    07/15/19 1948 07/16/19 0022 07/17/19 0500   Weight: 102.1 kg (225 lb) 100.7 kg (222 lb) 99.6 kg (219 lb 9.6 oz)     Vital Signs with Ranges  Temp:  [96.7  F (35.9  C)-97.9  F (36.6  C)] 97.7  F (36.5  C)  Heart Rate:  [65-76] 65  Resp:  [16-18] 18  BP: (118-151)/(59-70) 121/60  SpO2:  [92 %-94 %] 93 %  I/O last 3 completed shifts:  In: 790 [P.O.:680; I.V.:110]  Out: 750 [Urine:750]    GENERAL: Comfortable, no apparent distress.  CARDIOVASCULAR: regular rate and rhythm, no murmur. No lower extremity edema   RESPIRATORY: Clear to auscultation bilaterally, no wheezes or crackles.  GI: non-tender, non-distended, normal bowel sounds.   SKIN: warm periphery, no rashes      Medications     Continuing ACE inhibitor/ARB/ARNI from home medication list OR ACE inhibitor/ARB order already placed during  this visit       - MEDICATION INSTRUCTIONS -         dorzolamide-timolol  1 drop Both Eyes BID     famotidine  20 mg Oral Daily     latanoprost  1 drop Both Eyes QPM     loratadine  10 mg Oral Daily     losartan  50 mg Oral Daily     metoprolol tartrate  25 mg Oral BID     sodium chloride (PF)  3 mL Intracatheter Q8H       Data   Recent Labs   Lab 07/17/19  0420 07/16/19  0424 07/15/19  2012  07/11/19  1152   WBC 9.2 9.0 8.9   < > 10.0   HGB 12.2 12.4 12.2   < > 13.6   MCV 99 101* 100   < > 104*   * 117* 127*   < > 135*    137 135   < > 138   POTASSIUM 3.4* 3.6 3.8   < > 3.7   CHLORIDE 96* 99 101   < > 99   CO2 31 27 24   < > 28   BUN 14 15 15   < > 29*   CR 1.13 1.06 0.95   < > 1.91*   ANIONGAP 11 11 10   < > 11   ADRI 9.2 9.1 8.9   < > 10.8*   * 99 104   < > 129*   ALBUMIN 3.0*  --  3.1*   < > 3.7   PROTTOTAL 6.1*  --  6.1*   < > 7.1   BILITOTAL 6.0*  --  5.2*   < > 4.3*   ALKPHOS 282*  --  254*   < > 234*   ALT 75*  --  68*   < > 76*   *  --  132*   < > 120*   LIPASE  --   --   --   --  19    < > = values in this interval not displayed.       Recent Results (from the past 24 hour(s))   XR Chest 2 Views    Narrative    PROCEDURE:  XR CHEST 2 VW    HISTORY: lung crackles, .    COMPARISON:  7/15/2019, 7/12/2019    FINDINGS:  The cardiomediastinal contours are unchanged, with a right hilar mass  better seen on prior CT.  The trachea is midline.  There is calcific  aortic atherosclerosis.  A small right pleural effusion is slightly worse when compared to  recent priors. Mild interstitial thickening is questioned. No  pneumothorax is seen.    No suspicious osseous lesion or subdiaphragmatic free air.      Impression    IMPRESSION:      Slightly worse but still small right pleural effusion.  Question mild  interstitial pulmonary congestion/edema.    ZANA HAYWOOD MD

## 2019-07-17 NOTE — PROGRESS NOTES
Diet instruction:   Spoke with pt today about heart healthy nutrition therapy. She does use the salt shaker, discussed alternative seasonings. Since her  passed last fall, she has eaten more convenience foods such as TV dinners. Discussed the sodium content of those and to avoid as much as possible. She was familiar with the recommendations stating she followed a heart healthy diet years ago but has since not paid much attention to it. She does have a scale and plans to weigh herself to monitor fluid shifts. Provided with handout and explanation. She stated understanding and do expect compliance with recommendations. No questions at this time.     Isaura Kramer RD on 7/17/2019 at 11:03 AM

## 2019-07-17 NOTE — PROGRESS NOTES
Patient alert and oriented. Vitally stable. Continues to complain of slight shortness of breath with exertion. Lungs with crackles to the bases. Non productive cough. Bilateral legs with 1-2+ edema. Steady gait. Voiding adequate amounts. Urine remains dark tea colored. Will continue to monitor and implement interventions as needed.   Desi Barker RN on 7/17/2019 at 6:05 PM

## 2019-07-17 NOTE — PLAN OF CARE
Pt VSS, afebrile, denies pain.  Dry, nonproductive cough noted.  At one point made pt nauseous enough to request Zofran with relief. Pt was able to rest after that.  Lung sounds clear, bowel sounds active, heart rate regular.  Skin is bruised along arms and hands from previous IV's and lab draws.  Alert and oriented, up with SBA.  Currently sleeping in bed.  Will continue to monitor.

## 2019-07-18 NOTE — PROGRESS NOTES
Patient alert and oriented. Vitally stable. Remains on 1L of oxygen. Laid on right side for one hour follow procedure. Dressing to biopsy site remains clean dry and intact with no drainage present. Complains of slight discomfort but declines interventions at this time. Has had poor urine output througout day. Only dribbling episodes. Bladder scanned for 140mL. Dr. Fox notified. With her history of CHF, will just continue to monitor at this time.   Desi Barker RN on 7/18/2019 at 1:58 PM

## 2019-07-18 NOTE — PROGRESS NOTES
Patient continues to have no urine output. Bladder scanned for 212mL. Dr. Fox notified. New orders to monitor for 2-3 more hours and if no urine output, straight cath to ensure bladder scan is accurate and to empty bladder. Will continue to monitor.  Desi Barker RN on 7/18/2019 at 6:49 PM

## 2019-07-18 NOTE — PROGRESS NOTES
Dressing to right upper abdomen remains clean dry and intact. Has small (dime size) bruise at edge of dressing that has remained unchanged since return from radiology procedure. Has no increased pain at the site. Will continue to monitor and implement interventions as needed.   Desi Barker RN on 7/18/2019 at 6:30 PM

## 2019-07-18 NOTE — PROGRESS NOTES
Post Procedure Summary:  Prior to the start of the procedure, with procedural staff and physician participation, I verbally confirmed the patient s identity using two indicators, relevant allergies, that the procedure was appropriate and matched the consent or emergent situation, and that the correct equipment/implants were available. Immediately prior to starting the procedure I conducted the Time Out with the procedural staff and re-confirmed the patient s name, procedure, and site/side. (The Joint Commission universal protocol was followed.)  Yes       Sedatives: NONE, local anesthetic only   Vital signs, airway and pulse oximetry, capnography, and cardiac rhythm were monitored and remained stable throughout the procedure and sedation was maintained until the procedure was complete.  The patient was monitored by staff until sedation discharge criteria were met.    Patient tolerance: Patient tolerated the procedure well with no immediate complications.

## 2019-07-18 NOTE — PROGRESS NOTES
[facility]    Hospitalist Progress Note      Assessment & Plan   Karly Stephenson is a 84 year old female who was admitted on 7/15/2019.       Acute kidney injury     Assessment: creatinine jumped from 1.0 to 2.0 overnight. Probably due to poor intake and diuresis from volume overload. Liver process also playing a role. Doubt hepatorenal syndrome.     Plan: intravenous fluids, recheck in AM.       CHF (congestive heart failure) (H)    Assessment: improved    Plan: monitor for overload with restarting intravenous fluids.          Liver mass    Assessment: The patient most likely has metastatic disease. Lung mass seen on CT chest 7/12.    Plan: patient will have liver biopsy today. Called and left message for son Dr. Yoan Stephenson [phone].      Active Problems:    Coronary atherosclerosis    Assessment: chronic       Essential hypertension    Assessment: chronic    DVT Prophylaxis: Pneumatic Compression Devices  Code Status: Full Code    Moe Leon    Interval History   Feels tired. Mild dyspnea on exertion. No nausea    -Data reviewed today: I reviewed all new labs and imaging results over the last 24 hours. I personally reviewed no images or EKG's today.    Physical Exam   Temp: 97.7  F (36.5  C) Temp src: Tympanic BP: 117/51   Heart Rate: 61 Resp: 16 SpO2: 94 % O2 Device: Nasal cannula Oxygen Delivery: 1/2 LPM(decreased to RA)  Vitals:    07/16/19 0022 07/17/19 0500 07/18/19 0432   Weight: 100.7 kg (222 lb) 99.6 kg (219 lb 9.6 oz) 100.1 kg (220 lb 9.6 oz)     Vital Signs with Ranges  Temp:  [96.1  F (35.6  C)-97.7  F (36.5  C)] 97.7  F (36.5  C)  Heart Rate:  [59-71] 61  Resp:  [16-18] 16  BP: (103-123)/(51-65) 117/51  SpO2:  [85 %-95 %] 94 %  I/O last 3 completed shifts:  In: 480 [P.O.:480]  Out: 200 [Urine:200]    GENERAL: Comfortable, no apparent distress.  CARDIOVASCULAR: regular rate and rhythm, no murmur. No lower extremity edema   RESPIRATORY: scattered crackles   GI:  non-tender, non-distended, normal bowel sounds.   SKIN: warm periphery, no rashes      Medications     Continuing ACE inhibitor/ARB/ARNI from home medication list OR ACE inhibitor/ARB order already placed during this visit       - MEDICATION INSTRUCTIONS -       sodium chloride 125 mL/hr at 07/18/19 0638       dorzolamide-timolol  1 drop Both Eyes BID     famotidine  20 mg Oral Daily     latanoprost  1 drop Both Eyes QPM     loratadine  10 mg Oral Daily     losartan  50 mg Oral Daily     metoprolol tartrate  25 mg Oral BID     sodium chloride (PF)  3 mL Intracatheter Q8H       Data   Recent Labs   Lab 07/18/19  0825 07/18/19  0425 07/17/19  0420 07/16/19  0424  07/11/19  1152   WBC  --  9.5 9.2 9.0   < > 10.0   HGB  --  11.5* 12.2 12.4   < > 13.6   MCV  --  101* 99 101*   < > 104*   PLT  --  123* 127* 117*   < > 135*   INR 1.19  --   --   --   --   --    NA  --  133* 138 137   < > 138   POTASSIUM  --  3.5 3.4* 3.6   < > 3.7   CHLORIDE  --  95* 96* 99   < > 99   CO2  --  28 31 27   < > 28   BUN  --  20 14 15   < > 29*   CR  --  2.06* 1.13 1.06   < > 1.91*   ANIONGAP  --  10 11 11   < > 11   ADRI  --  8.5* 9.2 9.1   < > 10.8*   GLC  --  90 124* 99   < > 129*   ALBUMIN  --  2.7* 3.0*  --    < > 3.7   PROTTOTAL  --  5.6* 6.1*  --    < > 7.1   BILITOTAL  --  6.1* 6.0*  --    < > 4.3*   ALKPHOS  --  263* 282*  --    < > 234*   ALT  --  66* 75*  --    < > 76*   AST  --  135* 154*  --    < > 120*   LIPASE  --   --   --   --   --  19    < > = values in this interval not displayed.       Recent Results (from the past 24 hour(s))   XR Chest 2 Views    Narrative    PROCEDURE:  XR CHEST 2 VW    HISTORY: lung crackles, .    COMPARISON:  7/15/2019, 7/12/2019    FINDINGS:  The cardiomediastinal contours are unchanged, with a right hilar mass  better seen on prior CT.  The trachea is midline.  There is calcific  aortic atherosclerosis.  A small right pleural effusion is slightly worse when compared to  recent priors. Mild  interstitial thickening is questioned. No  pneumothorax is seen.    No suspicious osseous lesion or subdiaphragmatic free air.      Impression    IMPRESSION:      Slightly worse but still small right pleural effusion.  Question mild  interstitial pulmonary congestion/edema.    ZANA HAYWOOD MD

## 2019-07-18 NOTE — PROGRESS NOTES
Returned from ultrasound guided transcutaneous liver biopsy. BP 95/55, O2 was 76% on room air after transferring from cart to bed. 1L O2 via NC applied, aggie, 94% on 1L. One small needle insertion site on right anterior abdomen, below right breast. 2 core samples taken from this site, pathology present for biopsy procedure. Dressing is CDI. Patient states pain was tolerable. She is experiencing nausea, requests medication for this. Compazine available. Will give hand-off report to primary RN. IV fluids running as ordered. Patient lying on right side, and is to continue to lie on right side for 1 hour. Notify MD for increase in pain, bleeding from biopsy site, hematoma, hematemesis, or bloody stools.

## 2019-07-18 NOTE — PLAN OF CARE
Pt afebrile, vital signs stable. Lung have expiratory wheezing throughout, pt 85% on RA, writer applied 1 LPM NC O2 now 93%, denies sob. Patient had no urine output this shift,bladder scan 70 mls, MD Leon notified, new telephone orders for continuous normal saline 125 ml/hr with read back. Pt reports mild back discomfort, gave PRN Tylenol, see MAR will continue to monitor. Flaco Holt RN on 7/18/2019 at 6:09 AM

## 2019-07-19 PROBLEM — C78.7 METASTASIS TO LIVER (H): Status: ACTIVE | Noted: 2019-01-01

## 2019-07-19 PROBLEM — R91.8 MASS OF RIGHT LUNG: Status: ACTIVE | Noted: 2019-01-01

## 2019-07-19 NOTE — PLAN OF CARE
Dr. Mcneil explained pathology, UO remains min. Dr. Mcneil aware. 30 ml total out since arrival this am. Denies pain, denies need for anything.

## 2019-07-19 NOTE — PLAN OF CARE
Pt states I am tired, A &O x 4, assessment complete, pt is hypotensive. UO is 15 ml clear straw colored urine despite, increase in fluids. Pts is large however no impression to extremities from socks or other. Will continue to monitor.

## 2019-07-19 NOTE — PLAN OF CARE
Smalls cath inserted, no return urine. Irrigated with 50 mL of sterile water. No return besides the 50 mL of irrigant.

## 2019-07-19 NOTE — PROGRESS NOTES
MD updated on low urine output new orders for normal saline at 200 mL/hr     Barbara Cochran RN on 7/19/2019 at 1:13 AM

## 2019-07-19 NOTE — PROGRESS NOTES
[facility]    Hospitalist Progress Note      Assessment & Plan   Karly Stephenson is a 84 year old female who was admitted on 7/15/2019.  She was admitted initially with fluid overload and was diuresed.  Is then been noted that her creatinine is continued to climb and she has poor urine output at this point.    Principal Problem:    JAIMEE (acute kidney injury) (H)    Assessment: Creatinine is up to 3.36.  This is from a baseline of 1.0.  She had 1200 mL in and 0 urine output.  Bladder scan was negative.    Plan: IV Lasix, decrease IV fluids, hold losartan and decrease metoprolol dose.  Discussed situation with she and her daughter.  Both requested DNR/DNI status.  She would consider dialysis if necessary.  We will continue to monitor.  Active Problems:    Coronary atherosclerosis    Assessment: Stable    Plan: We will hold on Plavix for now    Essential hypertension    Assessment: Blood pressure is low    Plan: Adjust meds as noted above    Mass of right lung    Assessment: Probable lung cancer    Plan: In process of work-up    Metastasis to liver (H)    Assessment: Likely metastatic disease from the lung    Plan: Biopsy results pending    DVT Prophylaxis: Pneumatic Compression Devices  Code Status: DNR/DNI.  CODE STATUS was discussed this morning with the patient and her daughter.  Both emphasized that she requested DNR status.    EDIS RDZ    Interval History   Patient really has no specific complaints today.  States she just does not feel well.  No significant pain anywhere.  Denies shortness of breath.    -Data reviewed today: I reviewed all new labs and imaging results over the last 24 hours. I personally reviewed no images or EKG's today.  I reviewed her previous chest CT report as well as MRI reports.  Renal function is markedly abnormal with creatinine up to 3.36.  Liver function remains stable.    Physical Exam   Temp: 97.3  F (36.3  C) Temp src: Tympanic BP: (!) 99/36 Pulse:  60 Heart Rate: 63 Resp: 16 SpO2: 95 % O2 Device: Nasal cannula Oxygen Delivery: 1/2 LPM  Vitals:    07/17/19 0500 07/18/19 0432 07/19/19 0531   Weight: 99.6 kg (219 lb 9.6 oz) 100.1 kg (220 lb 9.6 oz) 103.6 kg (228 lb 6.4 oz)     Vital Signs with Ranges  Temp:  [96.9  F (36.1  C)-98.7  F (37.1  C)] 97.3  F (36.3  C)  Pulse:  [60] 60  Heart Rate:  [62-86] 63  Resp:  [16-18] 16  BP: ()/(36-60) 99/36  SpO2:  [93 %-96 %] 95 %  I/O last 3 completed shifts:  In: 2246 [P.O.:120; I.V.:2126]  Out: 53 [Urine:53]    Constitutional: She is awake and alert, lying in bed in no apparent distress.  She is pleasant and cooperative and answers questions appropriately  Respiratory: Lungs are clear  Cardiovascular: Regular rhythm with no murmur gallop  GI: Obese but soft and nontender  Skin/Integumen: Warm and dry, no diaphoresis  Other: No abnormal neurologic findings    Medications     Continuing ACE inhibitor/ARB/ARNI from home medication list OR ACE inhibitor/ARB order already placed during this visit       - MEDICATION INSTRUCTIONS -       sodium chloride 200 mL/hr at 07/19/19 0542       dorzolamide-timolol  1 drop Both Eyes BID     famotidine  20 mg Oral Daily     latanoprost  1 drop Both Eyes QPM     loratadine  10 mg Oral Daily     metoprolol tartrate  12.5 mg Oral BID     sodium chloride (PF)  3 mL Intracatheter Q8H       Data   Recent Labs   Lab 07/19/19  0518 07/18/19  0825 07/18/19  0425 07/17/19  0420   WBC 10.4  --  9.5 9.2   HGB 11.7  --  11.5* 12.2   *  --  101* 99   *  --  123* 127*   INR  --  1.19  --   --      --  133* 138   POTASSIUM 3.7  --  3.5 3.4*   CHLORIDE 98  --  95* 96*   CO2 25  --  28 31   BUN 27*  --  20 14   CR 3.36*  --  2.06* 1.13   ANIONGAP 11  --  10 11   ADRI 8.1*  --  8.5* 9.2   GLC 84  --  90 124*   ALBUMIN 2.7*  --  2.7* 3.0*   PROTTOTAL 5.6*  --  5.6* 6.1*   BILITOTAL 7.0*  --  6.1* 6.0*   ALKPHOS 292*  --  263* 282*   ALT 67*  --  66* 75*   *  --  135* 154*        Recent Results (from the past 24 hour(s))   US Biopsy Liver    Narrative    Procedure: US BIOPSY LIVER    HISTORY:  Diffuse infiltrative liver disease.    COMPARISON: MR abdomen 7/12/2019, CT 7/12/2019.    TECHNIQUE: The risks and benefits of percutaneous ultrasound guided  liver lesion biopsy including infection, bleeding, liver laceration,  bile duct fistula and nondiagnostic biopsy were discussed the patient  who expressed understanding and willingness to proceed. Informed  consent was obtained.    The patient was supine on the ultrasound table. The entire liver  remains heterogeneous. The overlying skin was marked, prepped and  draped in standard sterile fashion. Lidocaine was used to anesthetize  the superficial soft tissues. A 20-gauge coaxial core needle biopsy  system was used. 2 sequential 20-gauge biopsies were obtained across  the diseased liver. Immediate specimen adequacy was assessed by  pathology service. Initial passes were diagnostic.    The patient tolerated the procedure well and will be returned to their  inpatient room for monitoring.       Impression    IMPRESSION: Successful ultrasound-guided liver biopsy.    ZANA HAYWOOD MD

## 2019-07-19 NOTE — PLAN OF CARE
PT/OT on hold per MD request due to medical status, will remain involved as needed and continue to consult with MD.     Alana Mathews, OT on 7/19/2019 at 1:27 PM

## 2019-07-19 NOTE — PROGRESS NOTES
Urine output continues to be minimal.  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  EDIS RDZ MD on 7/19/2019 at 2:27 PM

## 2019-07-19 NOTE — PROGRESS NOTES
Incentive Spirometry education completed.  Pt goal 1500 mls.  Pt achieved 500 mls.  Pt instructed to perform 10/hr while awake with at least one deep breath and cough per hour until able to perform baseline activity.  RT will follow and re-assess as need.      Lisset Anand on 7/19/2019 at 7:43 AM

## 2019-07-19 NOTE — PLAN OF CARE
Patient voided 25 mL and bladder scan showed 295 residual. Straight cath preformed by house supervisor less than 5 mL of urine returned. Will continue to monitor.

## 2019-07-20 NOTE — PLAN OF CARE
"/48   Pulse 64   Temp 96.9  F (36.1  C) (Tympanic)   Resp 18   Ht 1.626 m (5' 4\")   Wt 107 kg (236 lb)   SpO2 90%   BMI 40.51 kg/m      Pt VS, Doctor aware of minimal output and JAIMEE.  Fluids decreased tko.  Pt anxious throughout night with minimal rest.  Dyspnea when in bed, pt sits at side of bed to relieve SOB.  Son at bedside and attentive to care.  LS clear with fine crackles in lower right lobe.  No pitting edema present but pt states she is swollen from baseline with an 8 lb weight gain.  Denies any pain.  Continue to monitor.  Azul Fox RN.............................7/20/2019 7:44 AM    "

## 2019-07-20 NOTE — PLAN OF CARE
Vss and assessment complete. Urine out put remains almost non resistant. Pt's edema has now turned to pitting +3, Lung sounds are very hard to here no extra noises noted. States she feels fine except maybe weak. Son has been here all morning. Pt is now in shower.

## 2019-07-20 NOTE — PROGRESS NOTES
[facility]    Hospitalist Progress Note      Assessment & Plan   Karly Stephenson is a 84 year old female who was admitted on 7/15/2019.  She subsequently has developed acute renal failure with anuria, and rising creatinine.    Principal Problem:    JAIMEE (acute kidney injury) (H)    Assessment: The etiology is not clear.  An ultrasound shows no evidence of obstruction.  Likely this is due to ATN.  Review of her blood pressure flowsheets indicates no significant hypotension.    Plan: Long discussion took place with she, her son, and then subsequently a phone call to nephrology in [place].  At this point, it is recommended to continue current observation, follow labs, and consider transfer for dialysis if she does not begin to make urine within the next 24 to 48 hours.  This is quite complicated, given her recent diagnosis of metastatic cancer.  She and her son are informed of this and in agreement at this point.  25 minutes were spent on the phone with the nephrologist.  Active Problems:    Coronary atherosclerosis    Assessment: Stable    Plan: No change    Essential hypertension    Assessment: Stable    Plan: No change    Mass of right lung    Assessment: Small cell carcinoma    Plan: Oncology consult if her renal function improves    Metastasis to liver (H)    Assessment: As above    Plan: Continue current treatments    DVT Prophylaxis: Pneumatic Compression Devices  Code Status: DNR/DNI    EDIS RDZ    Interval History   Just feels more tired.  No specific pain or complaints otherwise.  Gets more short of breath with movement.    -Data reviewed today: I reviewed all new labs and imaging results over the last 24 hours. I personally reviewed the chest x-ray image(s) showing Some atelectasis, no significant fluid accumulation..  Renal ultrasound shows no evidence of hydronephrosis or obstruction catheter in the bladder.  Laboratory studies continue to deteriorate with creatinine  4.37.    Physical Exam   Temp: 96.5  F (35.8  C) Temp src: Tympanic BP: (!) 101/32 Pulse: 63 Heart Rate: 62 Resp: 18 SpO2: 96 % O2 Device: Nasal cannula Oxygen Delivery: 1/2 LPM  Vitals:    07/18/19 0432 07/19/19 0531 07/20/19 0321   Weight: 100.1 kg (220 lb 9.6 oz) 103.6 kg (228 lb 6.4 oz) 107 kg (236 lb)     Vital Signs with Ranges  Temp:  [96.5  F (35.8  C)-97.9  F (36.6  C)] 96.5  F (35.8  C)  Pulse:  [61-69] 63  Heart Rate:  [62-71] 62  Resp:  [16-18] 18  BP: (101-114)/(32-49) 101/32  SpO2:  [84 %-96 %] 96 %  I/O last 3 completed shifts:  In: 240 [P.O.:240]  Out: 35 [Urine:35]    Constitutional: Looking quite fatigued, sitting in the chair in no apparent distress  Respiratory: Lungs are relatively clear  Cardiovascular: Regular rhythm with no murmur gallop  GI: Obese but soft and nontender  Skin/Integumen: Warm and dry, no diaphoresis  Other: No focal neurologic findings  Weight is up 6 pounds from 3 days ago.  Urine output is minimal.  Medications     - MEDICATION INSTRUCTIONS -         dorzolamide-timolol  1 drop Both Eyes BID     famotidine  20 mg Oral Daily     latanoprost  1 drop Both Eyes QPM     loratadine  10 mg Oral Daily     metoprolol tartrate  12.5 mg Oral BID     sodium chloride (PF)  3 mL Intracatheter Q8H       Data   Recent Labs   Lab 07/20/19  0535 07/19/19  0518 07/18/19  0825 07/18/19  0425   WBC 12.2* 10.4  --  9.5   HGB 12.1 11.7  --  11.5*   * 102*  --  101*   * 137*  --  123*   INR  --   --  1.19  --    * 134  --  133*   POTASSIUM 4.2 3.7  --  3.5   CHLORIDE 98 98  --  95*   CO2 16* 25  --  28   BUN 34* 27*  --  20   CR 4.37* 3.36*  --  2.06*   ANIONGAP 16* 11  --  10   ADRI 7.8* 8.1*  --  8.5*   GLC 81 84  --  90   ALBUMIN  --  2.7*  --  2.7*   PROTTOTAL  --  5.6*  --  5.6*   BILITOTAL  --  7.0*  --  6.1*   ALKPHOS  --  292*  --  263*   ALT  --  67*  --  66*   AST  --  143*  --  135*       Recent Results (from the past 24 hour(s))   XR Chest Port 1 View    Narrative     PROCEDURE:  XR CHEST PORT 1 VW    HISTORY:  lung cancer.     COMPARISON:  July 17, 2019    FINDINGS:   The cardiac silhouette is normal in size. The pulmonary vasculature is  normal.  There is some probable bibasilar atelectasis. No pleural  effusion or pneumothorax.      Impression    IMPRESSION:  Increased density at the lung bases most likely  atelectasis.      EMILY HOYT MD   US Renal Complete    Narrative    PROCEDURE: US RENAL COMPLETE    HISTORY: Renal failure.    TECHNIQUE: Targeted sonographic assessment of the kidneys and bladder  was performed.    COMPARISON:  None.    MEASUREMENTS:    Right renal length: 10.8 cm  Left renal length: 12.1 cm    RENAL FINDINGS: There are large bilateral renal cysts noted. There is  no hydronephrosis.    BLADDER: There is a Smalls catheter within the bladder related the  bladder is empty .      Impression    IMPRESSION:  Bilateral renal cysts. No hydronephrosis.      EMILY HOYT MD

## 2019-07-20 NOTE — PROGRESS NOTES
PT/OT spoke to Dr. Mcneil, pt too ill for therapy at this time.  Will hold until medically appropriate.     Velma Johnston, PT on 7/20/2019 at 1:26 PM

## 2019-07-21 NOTE — PROGRESS NOTES
Spoke to JESSICA Calderon at [facility] in [place], [place] at 1205 regarding update and patient transport is in route. Aware and no futher questions, informed to call with any further questions or concerns, none at completion of call.

## 2019-07-21 NOTE — DISCHARGE SUMMARY
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 Per the H&P:  \"Karly Stephenson is a 84 year old female who presents with dyspnea on exertion and dyspnea.  The patient was hospitalized at [facility] a week ago with acute kidney injury and found to have what appears to be invasive metastatic liver disease.  She was transferred to [facility] for a oncology consultation and biopsy.  These were not done on an inpatient basis and will be scheduled as an outpatient basis in the near future.  She presents to the emergency department unable to walk from a bed to the bathroom without stopping and resting.  She has a productive cough of clear sputum.  She notes lower extremity edema.  She denies any chest pain.  No fevers or chills.  No increased abdominal girth.     In the emergency department a chest x-ray shows pulmonary edema and she has an elevated B natruretic peptide.  She was admitted for acute heart failure.  The patient has a history of coronary artery disease.  An echocardiogram done in 2015 shows normal cardiac function.\"    Hospital Course   Karly Stephenson was admitted on 7/15/2019.  The following problems were addressed during her hospitalization:    Acute kidney injury   Initially the patient appeared fluid overloaded on admission.  She had normal kidney function.  She was given IV Lasix to diurese which improved her " breathing, lower extremity edema, and pulmonary edema.  She did not have a drop in blood pressure during this period of time nor at any point during her hospital stay.  On July 18 her creatinine started to climb.  Nephrotoxic medications were held.  She was given IV fluid boluses.  She had a net positive fluid balance by the end of her hospitalization despite coming in because of fluid overload.  Renal ultrasound was obtained showing no evidence for obstruction.  Despite her diagnosis on pathology of metastatic small cell cancer, and her acute renal failure, the patient is not ready for comfort care.  We had long discussions during her hospitalization, and she discussed things with her son Yoan.  At this point, she does not need urgent dialysis, her kidney function continues to worsen.  I spoke with Dr. Jane at [facility] service who accepted the patient in transfer for nephrology, oncology, gastroenterology consults.    Metastatic small cell cancer  Suspected lung primary based on CT of the chest showing a hilar mass.  A needle biopsy was performed on Thursday, July 18, a verbal report on pathology was given to Dr. Mcneil on July 19 indicating small cell carcinoma.  Her liver tests have remained cholestatic with a climbing bilirubin, now up to 7.0, as well as a mildly elevated alkaline phosphatase and transaminases.    Fluid overload  Initially the patient came in and fluid overload.  An echocardiogram showed mild pulmonary hypertension with normal systolic function.    Moe Leon MD      Code Status   DNR / DNI       Primary Care Physician   Sydney Viera    Physical Exam   Temp: 96.7  F (35.9  C) Temp src: Tympanic BP: 96/48 Pulse: 64 Heart Rate: 63 Resp: 16 SpO2: 95 % O2 Device: Nasal cannula Oxygen Delivery: 1 LPM  Vitals:    07/18/19 0432 07/19/19 0531 07/20/19 0321   Weight: 100.1 kg (220 lb 9.6 oz) 103.6 kg (228 lb 6.4 oz) 107 kg (236 lb)     Vital Signs with Ranges  Temp:  [96.7  F (35.9   C)-98  F (36.7  C)] 96.7  F (35.9  C)  Pulse:  [64] 64  Heart Rate:  [63-65] 63  Resp:  [16-18] 16  BP: ()/(36-48) 96/48  SpO2:  [90 %-97 %] 95 %  I/O last 3 completed shifts:  In: 3 [I.V.:3]  Out: 60 [Urine:60]    GENERAL: Comfortable, no apparent distress.  CARDIOVASCULAR: regular rate and rhythm, no murmur. No lower extremity edema   RESPIRATORY: Clear to auscultation bilaterally, no wheezes or crackles.  GI: non-tender, non-distended, normal bowel sounds.   SKIN: warm periphery, no rashes      Discharge Disposition   Discharged to [facility]  Condition at discharge: Stable    Consultations This Hospital Stay   CARE COORDINATOR IP CONSULT  NUTRITION SERVICES ADULT IP CONSULT  PHYSICAL THERAPY ADULT IP CONSULT  OCCUPATIONAL THERAPY ADULT IP CONSULT  PHYSICAL THERAPY ADULT IP CONSULT  OCCUPATIONAL THERAPY ADULT IP CONSULT    Time Spent on this Encounter   I, Moe Leon, personally saw the patient today and spent greater than 30 minutes discharging this patient.    Discharge Orders      US Biopsy Liver    Radiologist states Us or Ct     Reason for your hospital stay    New diagnosis liver cancer and kidney failure     Follow Up and recommended labs and tests    Hospital follow up on July 26th at 10:20 AM with Sydney Viera DO.     Activity - Up ad barrera     DNR/DNI     Physical Therapy Adult Consult    Evaluate and treat as clinically indicated.    Reason:  weakness     Occupational Therapy Adult Consult    Evaluate and treat as clinically indicated.    Reason:  Activities of daily living     Advance Diet as Tolerated    Follow this diet upon discharge: Orders Placed This Encounter      Combination Diet Regular Diet Adult       Discharge Medications   Discharge Medication List as of 7/21/2019 11:48 AM      START taking these medications    Details   sodium chloride 0.9% infusion Inject 75 mL/hr into the vein continuous, Transitional         CONTINUE these medications which have NOT CHANGED    Details    acetaminophen (TYLENOL) 500 MG tablet Take 1,000 mg by mouth daily as needed for mild pain, Historical      calcium citrate-vitamin D (CALCIUM CITRATE + D) 315-250 MG-UNIT TABS per tablet Take 2 tablets by mouth daily , Historical      clobetasol (TEMOVATE) 0.05 % external ointment Apply topically 2 times daily as neededHistorical      cyanocobalamin (RA VITAMIN B-12 TR) 1000 MCG TBCR Take 1,000 mcg by mouth daily, Historical      docusate sodium (COLACE) 100 MG capsule Take 100 mg by mouth daily as needed for constipation , Historical      famotidine (PEPCID) 20 MG tablet Take 1 tablet (20 mg) by mouth 2 times daily, Disp-180 tablet, R-4, E-Prescribe      metoprolol tartrate (LOPRESSOR) 25 MG tablet Take 1 tablet (25 mg) by mouth 2 times daily, Disp-180 tablet, R-4, E-Prescribe      simvastatin (ZOCOR) 20 MG tablet Take 1 tablet (20 mg) by mouth At Bedtime, Disp-90 tablet, R-4, E-Prescribe      dorzolamide-timolol (COSOPT) 2-0.5 % ophthalmic solution Place 1 drop into both eyes 2 times daily, Historical      latanoprost (XALATAN) 0.005 % ophthalmic solution Place 1 drop into both eyes every evening, Historical      loratadine (CLARITIN) 10 MG tablet Take 1 tablet (10 mg) by mouth daily, Disp-90 tablet, R-4, E-Prescribe      nitroGLYcerin (NITROSTAT) 0.4 MG sublingual tablet Place 1 tablet (0.4 mg) under the tongue every 5 minutes as needed for chest pain, Disp-25 tablet, R-0, E-Prescribe         STOP taking these medications       aspirin EC 81 MG EC tablet Comments:   Reason for Stopping:         clopidogrel (PLAVIX) 75 MG tablet Comments:   Reason for Stopping:         losartan (COZAAR) 50 MG tablet Comments:   Reason for Stopping:         traMADol (ULTRAM) 50 MG tablet Comments:   Reason for Stopping:             Allergies   Allergies   Allergen Reactions     Codeine Nausea     Doxycycline Other (See Comments)     jittery     Data   Most Recent 3 CBC's:  Recent Labs   Lab Test 07/21/19  0515 07/20/19  0524  07/19/19  0518   WBC 12.1* 12.2* 10.4   HGB 11.9 12.1 11.7   * 102* 102*   * 140* 137*      Most Recent 3 BMP's:  Recent Labs   Lab Test 07/21/19  0515 07/20/19  0535 07/19/19  0518   * 130* 134   POTASSIUM 4.9 4.2 3.7   CHLORIDE 98 98 98   CO2 19* 16* 25   BUN 45* 34* 27*   CR 5.36* 4.37* 3.36*   ANIONGAP 15* 16* 11   ADRI 7.9* 7.8* 8.1*   GLC 88 81 84     Most Recent 2 LFT's:  Recent Labs   Lab Test 07/19/19  0518 07/18/19  0425   * 135*   ALT 67* 66*   ALKPHOS 292* 263*   BILITOTAL 7.0* 6.1*     Most Recent INR's and Anticoagulation Dosing History:  Anticoagulation Dose History     Recent Dosing and Labs Latest Ref Rng & Units 7/18/2019    INR 0 - 1.3 1.19        Most Recent 3 Troponin's:No lab results found.  Most Recent Cholesterol Panel:  Recent Labs   Lab Test 11/23/16  1119   LDL 50   HDL 69   TRIG 80     Most Recent 6 Bacteria Isolates From Any Culture (See EPIC Reports for Culture Details):No lab results found.  Most Recent TSH, T4 and A1c Labs:No lab results found.  Results for orders placed or performed during the hospital encounter of 07/15/19   XR Chest 2 Views    Narrative    PROCEDURE:  XR CHEST 2 VW    HISTORY: SOB; SOB (shortness of breath), .    COMPARISON:  7/13/2019    FINDINGS:  The cardiomediastinal contours are unchanged, including a right hilar  mass better seen on prior CT.  The trachea is midline.  There is progressive cephalization of vasculature and new trace  pleural effusions. Multiple Kerley B-lines are suggested.    No suspicious osseous lesion or subdiaphragmatic free air.      Impression    IMPRESSION:      Findings suggest worsening CHF when compared to 7/13/2019.      ZANA HAYWOOD MD   XR Chest 2 Views    Narrative    PROCEDURE:  XR CHEST 2 VW    HISTORY: lung crackles, .    COMPARISON:  7/15/2019, 7/12/2019    FINDINGS:  The cardiomediastinal contours are unchanged, with a right hilar mass  better seen on prior CT.  The trachea is midline.  There  is calcific  aortic atherosclerosis.  A small right pleural effusion is slightly worse when compared to  recent priors. Mild interstitial thickening is questioned. No  pneumothorax is seen.    No suspicious osseous lesion or subdiaphragmatic free air.      Impression    IMPRESSION:      Slightly worse but still small right pleural effusion.  Question mild  interstitial pulmonary congestion/edema.    ZANA HAYWOOD MD   XR Chest Port 1 View    Narrative    PROCEDURE:  XR CHEST PORT 1 VW    HISTORY:  lung cancer.     COMPARISON:  July 17, 2019    FINDINGS:   The cardiac silhouette is normal in size. The pulmonary vasculature is  normal.  There is some probable bibasilar atelectasis. No pleural  effusion or pneumothorax.      Impression    IMPRESSION:  Increased density at the lung bases most likely  atelectasis.      EMILY HOYT MD   US Renal Complete    Narrative    PROCEDURE: US RENAL COMPLETE    HISTORY: Renal failure.    TECHNIQUE: Targeted sonographic assessment of the kidneys and bladder  was performed.    COMPARISON:  None.    MEASUREMENTS:    Right renal length: 10.8 cm  Left renal length: 12.1 cm    RENAL FINDINGS: There are large bilateral renal cysts noted. There is  no hydronephrosis.    BLADDER: There is a Smalls catheter within the bladder related the  bladder is empty .      Impression    IMPRESSION:  Bilateral renal cysts. No hydronephrosis.      EMILY HOYT MD

## 2019-07-21 NOTE — PLAN OF CARE
Spoke to MD Dr. Leon at 1020 regarding patient's most recent vital signs, blood pressure and medications currently due. MD aware and hold am lopressor at this time.     Patient up in recliner with legs elevated and visiting with family. Minimal output in pereira catheter. Urine is dark carly/brown in color.     Spoke to [facility] ambulance crew to set up transport at 1120, [facility] one aware and transport to come in about an hour. Will update family and patient.     Spoke to and gave nurse to nurse Kvng RN at 1125. Patient is going to [address] [place] [address] bed 2 tentatively. Patient is going to [facility] in [place], [place] via [facility] ambulance crew. Informed to call with any further questions or concerns, none at completion of call.      Reached at: [phone]

## 2019-07-21 NOTE — PLAN OF CARE
"BP (!) 110/39   Pulse 64   Temp 96.7  F (35.9  C) (Tympanic)   Resp 18   Ht 1.626 m (5' 4\")   Wt 107 kg (236 lb)   SpO2 97%   BMI 40.51 kg/m      Pt VS.  Increasingly confused throughout shift.  Still appropriate and utilizing call light when needed.  Continue to reorient pt to place time and situation.  Will continue to monitor.  Azul Fox RN.............................7/21/2019 4:42 AM     "

## 2019-07-21 NOTE — PLAN OF CARE
[facility] DISCHARGE NOTE    Patient discharged to transferred to [facility] in [place], [place] via [facility] ambulance service/crew for higher level of care and continuation of care at 12:00 PM via cart. Accompanied by son and other:lots of family present at time of transfer/discharge  and staff. Discharge instructions reviewed with nurse to nurse completed (lizzy/JESSICA Calderon at receiving facility) and KAMAR sent with patient and [facility]ulance crew , opportunity offered to ask questions. Prescriptions - None ordered for discharge. All belongings sent with patient and her family members. Cane sent with family members. IV fluids infusing at time of transport per MD request and orders.     Zeenat Sommer

## (undated) RX ORDER — SODIUM CHLORIDE 9 MG/ML
INJECTION, SOLUTION INTRAVENOUS
Status: DISPENSED
Start: 2019-01-01

## (undated) RX ORDER — FUROSEMIDE 10 MG/ML
INJECTION INTRAMUSCULAR; INTRAVENOUS
Status: DISPENSED
Start: 2019-01-01

## (undated) RX ORDER — ONDANSETRON 2 MG/ML
INJECTION INTRAMUSCULAR; INTRAVENOUS
Status: DISPENSED
Start: 2019-01-01

## (undated) RX ORDER — FUROSEMIDE 40 MG
TABLET ORAL
Status: DISPENSED
Start: 2019-01-01